# Patient Record
Sex: FEMALE | Race: AMERICAN INDIAN OR ALASKA NATIVE
[De-identification: names, ages, dates, MRNs, and addresses within clinical notes are randomized per-mention and may not be internally consistent; named-entity substitution may affect disease eponyms.]

---

## 2017-04-01 NOTE — HISTORY AND PHYSICAL REPORT
History of Present Illness


Chief complaint: 





I cant breathe


History of present illness: 


63 YO Female with HTN, MI, CAD, PUD, HLD, Vertigo, presents to ED for 

evaluation. Pt states that she has been experiencing shortness of breath for 

the past several days, with worsening symptoms over the past 1 day. Pt 

acknowledges orthopnea, PND, and l bilateral leg swelling. Pt denies productive 

cough, fever, chills, CP, Palpitations, NVD, syncope, vertigo, trauma, 

prolonged immobility/travel, individual/family history of DVT/PE. 











Past History


Past Medical History: acute MI, CAD, hyperlipidemia


Past Surgical History: CABG, , Other (Right leg surgery)


Social history: single.  denies: smoking, alcohol abuse, prescription drug abuse


Family history: CAD, diabetes, hypertension





Medications and Allergies


 Allergies











Allergy/AdvReac Type Severity Reaction Status Date / Time


 


No Known Allergies Allergy   Verified 09/03/15 03:02











 Home Medications











 Medication  Instructions  Recorded  Confirmed  Last Taken  Type


 


Metoprolol [Lopressor TAB] 50 mg PO BID #60 tablet 09/04/15 04/01/17 06/24/16 Rx


 


amLODIPine [Norvasc] 10 mg PO QDAY #30 tablet 09/04/15 04/01/17 Unknown Rx


 


Cilostazol [Pletal] 50 mg PO BID 16 Unknown History


 


Clopidogrel [Plavix] 75 mg PO QDAY 16 Unknown History


 


Lisinopril [Zestril TAB] 40 mg PO QDAY 16 History


 


ALBUTEROL Inhaler [Proair] 2 puff IH QID PRN 17 Unknown History


 


AtorvaSTATin [Lipitor] 80 mg PO QHS 17 Unknown History


 


Hydrochlorothiazide [HCTZ] 25 mg PO QAM 17 Unknown History














Review of Systems


All systems: negative


Cardiovascular: orthopnea, shortness of breath, dyspnea on exertion, paroxysmal 

nocturnal dyspnea, leg edema


Respiratory: shortness of breath





Exam





- Constitutional


Vitals: 


 











Temp Pulse Resp BP Pulse Ox


 


 98.1 F   105 H  16   178/119   98 


 


 17 09:23  17 15:15  17 15:15  17 15:15  17 15:15











General appearance: Present: no acute distress, well-nourished





- EENT


Eyes: Present: PERRL


ENT: hearing intact, clear oral mucosa





- Neck


Neck: Present: supple, normal ROM





- Respiratory


Respiratory effort: normal


Respiratory: bilateral: diminished, rhonchi





- Cardiovascular


Heart Sounds: Present: S1 & S2.  Absent: rub, click





- Extremities


Extremities: pulses symmetrical, No edema


Extremity abnormal: edema


Peripheral Pulses: within normal limits





- Abdominal


General gastrointestinal: Present: soft, non-tender, non-distended, normal 

bowel sounds


Female genitourinary: Present: normal





- Integumentary


Integumentary: Present: clear, warm, dry





- Musculoskeletal


Musculoskeletal: gait normal, strength equal bilaterally





- Psychiatric


Psychiatric: appropriate mood/affect, intact judgment & insight





- Neurologic


Neurologic: CNII-XII intact, moves all extremities





Results





- Labs


CBC & Chem 7: 


 17 07:03





 17 07:03


Labs: 


 Abnormal lab results











  17 Range/Units





  07:03 07:03 10:39 


 


RBC   5.47 H   (3.65-5.03)  M/mm3


 


Hgb   15.3 H   (10.1-14.3)  gm/dl


 


Hct   47.1 H   (30.3-42.9)  %


 


D-Dimer    486.58 H  (0-234)  ng/mlDDU


 


Glucose  119 H    ()  mg/dL


 


NT-Pro-B Natriuret Pep  7335 H    (0-900)  pg/mL














Assessment and Plan





- Patient Problems


(1) Acute respiratory failure with hypoxemia


Current Visit: Yes   Status: Acute   


Plan to address problem: 


Supplemental oxygen, nebs, aspiration precautions, NIPPV as clinically indicated

, pulmonary toilet, incentive spirometry








(2) CHF (congestive heart failure)


Current Visit: Yes   Status: Suspected   


Qualifiers: 


   Congestive heart failure type: C   Congestive heart failure chronicity: C 


Plan to address problem: 


CHF Protocol: Cardiology consulted, echo, serial cardiac enzymes, ekg, 

telemetry monitoring, fluid restriction, daily weight, afterload reduction.








(3) Accelerated hypertension


Current Visit: Yes   Status: Acute   


Plan to address problem: 


monitor bp q shift, continue current antihypertensive therapy








(4) HLD (hyperlipidemia)


Current Visit: Yes   Status: Acute   


Qualifiers: 


   Hyperlipidemia type: H 


Plan to address problem: 


continue statin therapy








(5) DVT prophylaxis


Current Visit: Yes   Status: Acute

## 2017-04-01 NOTE — EMERGENCY DEPARTMENT REPORT
ED Shortness of Breath HPI





- General


Chief Complaint: Dyspnea/Respdistress


Stated Complaint: DIFFICULTY IN BREATHING


Time Seen by Provider: 04/01/17 10:10


Source: patient


Mode of arrival: Ambulatory


Limitations: No Limitations





- History of Present Illness


Initial Comments: 


The patient describes episodes of essentially paroxysmal nocturnal dyspnea.  

She states that this occurs when a 1 time a night.  She does not describe 

difficulty and cough or hemoptysis.  She denies chest pain pressure or 

tightness.  She's had no hemoptysis.  She denies leg pain or swelling.  She 

does state that these episodes to improve with sitting up.





The patient is status post CABG she states triple bypass at [facility] 5 

years ago.  She states she has not followed up with cardiologist for quite some 

time maybe even since then.  She is probably had an angioplasty of her right 

leg as well.  She does not complain of any claudication symptoms.  She does not 

give a history of CHF per se.  She has a history of hypertension.





Apparently the patient was hyperventilating on arrival.  This did improve with 

nursing de-escalation.  At the time I saw her she was breathing normal 

respiratory rate.





MD Complaint: shortness of breath


-: week(s)


Consistency: intermittent


Known History Of: other


Associated Symptoms: denies other symptoms


Treatments Prior to Arrival: none





- Related Data


Home Oxygen Therapy: No


 Home Medications











 Medication  Instructions  Recorded  Confirmed  Last Taken


 


Cilostazol [Pletal] 50 mg PO BID 06/25/16 06/25/16 Unknown


 


Clopidogrel [Plavix] 75 mg PO QDAY 06/25/16 06/25/16 Unknown


 


Lisinopril [Zestril TAB] 40 mg PO QDAY 06/25/16 06/25/16 06/24/16








 Previous Rx's











 Medication  Instructions  Recorded  Last Taken  Type


 


Metoprolol [Lopressor TAB] 50 mg PO BID #60 tablet 09/04/15 06/24/16 Rx


 


amLODIPine [Norvasc] 10 mg PO QDAY #30 tablet 09/04/15 Unknown Rx











 Allergies











Allergy/AdvReac Type Severity Reaction Status Date / Time


 


No Known Allergies Allergy   Verified 09/03/15 03:02














ED Review of Systems


ROS: 


Stated complaint: DIFFICULTY IN BREATHING


Other details as noted in HPI





Constitutional: denies: chills, fever


Eyes: denies: eye pain, eye discharge, vision change


ENT: denies: ear pain, throat pain


Respiratory: shortness of breath.  denies: cough, wheezing


Cardiovascular: denies: chest pain, palpitations


Endocrine: no symptoms reported


Gastrointestinal: denies: abdominal pain, nausea, diarrhea


Genitourinary: denies: urgency, dysuria, discharge


Musculoskeletal: denies: back pain, joint swelling, arthralgia


Skin: denies: rash, lesions


Neurological: denies: headache, weakness, paresthesias


Psychiatric: denies: anxiety, depression


Hematological/Lymphatic: denies: easy bleeding, easy bruising





ED Past Medical Hx





- Past Medical History


Previous Medical History?: Yes


Hx Hypertension: Yes


Hx Heart Attack/AMI: Yes





- Surgical History


Past Surgical History?: Yes


Additional Surgical History: triple bypass, rt leg surgery,CS





- Social History


Smoking Status: Never Smoker


Substance Use Type: None





- Medications


Home Medications: 


 Home Medications











 Medication  Instructions  Recorded  Confirmed  Last Taken  Type


 


Metoprolol [Lopressor TAB] 50 mg PO BID #60 tablet 09/04/15 06/25/16 06/24/16 Rx


 


amLODIPine [Norvasc] 10 mg PO QDAY #30 tablet 09/04/15 06/25/16 Unknown Rx


 


Cilostazol [Pletal] 50 mg PO BID 06/25/16 06/25/16 Unknown History


 


Clopidogrel [Plavix] 75 mg PO QDAY 06/25/16 06/25/16 Unknown History


 


Lisinopril [Zestril TAB] 40 mg PO QDAY 06/25/16 06/25/16 06/24/16 History














ED Physical Exam





- General


Limitations: No Limitations


General appearance: alert, in no apparent distress





- Head


Head exam: Present: atraumatic, normocephalic





- Eye


Eye exam: Present: normal appearance.  Absent: scleral icterus





- ENT


ENT exam: Present: normal exam, mucous membranes moist





- Neck


Neck exam: Present: normal inspection.  Absent: tenderness, meningismus





- Respiratory


Respiratory exam: Present: normal lung sounds bilaterally.  Absent: respiratory 

distress





- Cardiovascular


Cardiovascular Exam: Present: regular rate, normal rhythm.  Absent: systolic 

murmur, diastolic murmur, rubs, gallop





- GI/Abdominal


GI/Abdominal exam: Present: soft, normal bowel sounds.  Absent: distended, 

tenderness, guarding, rebound, rigid





- Extremities Exam


Extremities exam: Present: normal inspection





- Back Exam


Back exam: Present: normal inspection





- Neurological Exam


Neurological exam: Present: alert, oriented X3, CN II-XII intact.  Absent: 

motor sensory deficit





- Psychiatric


Psychiatric exam: Present: normal affect, normal mood





- Skin


Skin exam: Present: warm, dry, intact, normal color.  Absent: rash





ED Course


 Vital Signs











  04/01/17 04/01/17 04/01/17





  06:52 09:11 09:20


 


Temperature 98.6 F  


 


Pulse Rate 98 H  88


 


Respiratory 24  28 H





Rate   


 


Blood Pressure   168/115


 


Blood Pressure 179/106  





[Right]   


 


O2 Sat by Pulse 99 99 100





Oximetry   














  04/01/17 04/01/17 04/01/17





  09:23 09:30 09:40


 


Temperature 98.1 F  


 


Pulse Rate 85 80 82


 


Respiratory 16 21 15





Rate   


 


Blood Pressure  166/89 166/89


 


Blood Pressure 168/115  





[Right]   


 


O2 Sat by Pulse 98 99 100





Oximetry   














  04/01/17 04/01/17 04/01/17





  09:50 10:00 10:10


 


Temperature   


 


Pulse Rate 85  


 


Respiratory 19 24 12





Rate   


 


Blood Pressure 162/88 159/88 162/88


 


Blood Pressure   





[Right]   


 


O2 Sat by Pulse 100 99 99





Oximetry   














  04/01/17 04/01/17 04/01/17





  10:20 11:38 11:40


 


Temperature   


 


Pulse Rate   


 


Respiratory 12 15 19





Rate   


 


Blood Pressure 162/90 162/90 162/90


 


Blood Pressure   





[Right]   


 


O2 Sat by Pulse 99 99 99





Oximetry   














  04/01/17 04/01/17 04/01/17





  11:50 12:00 12:10


 


Temperature   


 


Pulse Rate   


 


Respiratory 15 10 L 15





Rate   


 


Blood Pressure 164/104 156/100 162/90


 


Blood Pressure   





[Right]   


 


O2 Sat by Pulse 97 97 99





Oximetry   














  04/01/17 04/01/17 04/01/17





  12:20 12:30 12:40


 


Temperature   


 


Pulse Rate   


 


Respiratory 15 14 16





Rate   


 


Blood Pressure 168/109 162/108 162/108


 


Blood Pressure   





[Right]   


 


O2 Sat by Pulse 99 99 99





Oximetry   














  04/01/17 04/01/17





  15:10 15:15


 


Temperature  


 


Pulse Rate  105 H


 


Respiratory  16





Rate  


 


Blood Pressure 162/108 


 


Blood Pressure  178/119





[Right]  


 


O2 Sat by Pulse 98 98





Oximetry  














- Reevaluation(s)


Reevaluation #1: 


CTA of the chest was performed to exclude pulmonary embolism.  Per the 

radiologist that showed an incidental guide wire piece in the patient's left 

pulmonary artery.  It was not very compelling for pulmonary edema.  In fact the 

lung fields were essentially clear.  Cardiomegaly was noted.


04/01/17 15:39





Reevaluation #2: 


Patient given aspirin and hydralazine and Nitropaste.  She is referred to Dr. Montgomery for further evaluation on the hospitalist service.  I believe the 

radiologist's observation of the pulmonary intralobar artery catheter wire 

months surely be chronic as the patient has had no catheterization for 5 years


04/01/17 15:43








ED Medical Decision Making





- Lab Data


Result diagrams: 


 04/01/17 07:03





 04/01/17 07:03








 Laboratory Results - last 24 hr











  04/01/17 04/01/17





  07:03 07:03


 


WBC   8.8


 


RBC   5.47 H


 


Hgb   15.3 H


 


Hct   47.1 H


 


MCV   86


 


MCH   28


 


MCHC   32


 


RDW   14.2


 


Plt Count   222


 


Lymph % (Auto)   24.8


 


Mono % (Auto)   5.9


 


Eos % (Auto)   1.4


 


Baso % (Auto)   0.7


 


Lymph #   2.2


 


Mono #   0.5


 


Eos #   0.1


 


Baso #   0.1


 


Seg Neutrophils %   67.2


 


Seg Neutrophils #   5.9


 


Sodium  139 


 


Potassium  4.1 


 


Chloride  100.5 


 


Carbon Dioxide  22 


 


Anion Gap  21 


 


BUN  17 


 


Creatinine  1.2 


 


Estimated GFR  55 


 


BUN/Creatinine Ratio  14.16 


 


Glucose  119 H 


 


Calcium  9.7 


 


Total Creatine Kinase  53 


 


CK-MB (CK-2)  1.5 


 


CK-MB (CK-2) Rel Index  2.8 


 


Troponin T  < 0.010 


 


NT-Pro-B Natriuret Pep  7335 H 














 Laboratory Results - last 24 hr











  04/01/17 04/01/17 04/01/17





  07:03 07:03 10:34


 


WBC   8.8 


 


RBC   5.47 H 


 


Hgb   15.3 H 


 


Hct   47.1 H 


 


MCV   86 


 


MCH   28 


 


MCHC   32 


 


RDW   14.2 


 


Plt Count   222 


 


Lymph % (Auto)   24.8 


 


Mono % (Auto)   5.9 


 


Eos % (Auto)   1.4 


 


Baso % (Auto)   0.7 


 


Lymph #   2.2 


 


Mono #   0.5 


 


Eos #   0.1 


 


Baso #   0.1 


 


Seg Neutrophils %   67.2 


 


Seg Neutrophils #   5.9 


 


Sodium  139  


 


Potassium  4.1  


 


Chloride  100.5  


 


Carbon Dioxide  22  


 


Anion Gap  21  


 


BUN  17  


 


Creatinine  1.2  


 


Estimated GFR  55  


 


BUN/Creatinine Ratio  14.16  


 


Glucose  119 H  


 


Calcium  9.7  


 


Total Creatine Kinase  53  


 


CK-MB (CK-2)  1.5  


 


CK-MB (CK-2) Rel Index  2.8  


 


Troponin T  < 0.010  


 


NT-Pro-B Natriuret Pep  7335 H  


 


Urine Color    Straw


 


Urine Turbidity    Clear


 


Urine pH    5.0


 


Ur Specific Gravity    1.004


 


Urine Protein    <15 mg/dl


 


Urine Glucose (UA)    Neg


 


Urine Ketones    Neg


 


Urine Blood    Neg


 


Urine Nitrite    Neg


 


Urine Bilirubin    Neg


 


Urine Urobilinogen    < 2.0


 


Ur Leukocyte Esterase    Neg


 


Urine WBC (Auto)    < 1.0


 


Urine RBC (Auto)    1.0


 


U Epithel Cells (Auto)    < 1.0

















- EKG Data


-: EKG Interpreted by Me


EKG shows normal: sinus rhythm, axis, intervals, QRS complexes, ST-T waves


Rate: normal





- EKG Data


Interpretation: LVH, other (occasional PVC)





- Radiology Data


Radiology results: report reviewed


interpreted by me: 


Associated cardiomegaly and perhaps slight cephalization of flow.





CTA of the chest was performed to exclude pulmonary embolism.  Per the 

radiologist that showed an incidental guide wire piece in the patient's left 

pulmonary artery.  It was not very compelling for pulmonary edema.  In fact the 

lung fields were essentially clear.  Cardiomegaly was noted.





Critical care attestation.: 


If time is entered above; I have spent that time in minutes in the direct care 

of this critically ill patient, excluding procedure time.








ED Disposition


Clinical Impression: 


 Paroxysmal nocturnal dyspnea, Diastolic dysfunction, Foreign body, 

Uncontrolled hypertension





Disposition: OP ADMITTED AS IP TO THIS HOSP


Is pt being admited?: Yes


Does the pt Need Aspirin: Yes


Condition: Stable


Instructions:  Hypertension (ED)


Referrals: 


PRIMARY CARE,MD [Primary Care Provider] - 3-5 Days


Time of Disposition: 15:43

## 2017-04-01 NOTE — CAT SCAN REPORT
FINAL REPORT



EXAM:  CT ANGIO CHEST



HISTORY:  TAE 



TECHNIQUE:  CTA of the chest with IV contrast. Multiplanar

reformations.  MIP images. 



PRIORS:  None.



FINDINGS:  

No thoracic aortic aneurysm or dissection seen. Moderate cardiac

enlargement. Visualized portions of the upper abdomen show no

significant abnormality. Status post sternotomy. No

pathologically enlarged lymph nodes seen. No pulmonary embolism

seen. There is a linear metallic density in the left interlobar

artery, compatible with a foreign body. This could be from a

prior catheterization. No thrombus is seen, however. 



No pleural effusion or significant lung consolidation seen. 



IMPRESSION:  

1. No pulmonary embolism seen. Metallic wire in the left

intralobar artery may be from a prior catheterization procedure. 



Christian Category 1 finding. I discussed these results immediately

before signing this report with Dr. Fine.

## 2017-04-01 NOTE — XRAY REPORT
Chest 2 views: Compared to 9/3/15.



History: Shortness of breath.



Findings:



Cardiomegaly. Trachea is midline. No consolidation, pneumothorax or 

pleural effusion.



Impression:



Cardiomegaly. No acute lung changes.

## 2017-04-02 NOTE — PROGRESS NOTE
Assessment and Plan


Assessment and plan: 





1.  Acute hypoxemic respiratory failure.  Continue supplemental oxygen, 

pulmonary toileting, incentive spirometry and NIPPV as needed.  Etiology 

secondary to CHF exacerbation.





2.  Acute CHF decompensation.  Etiology of systolic or diastolic unknown.  

Check echocardiogram.  Cardiology consultation pending.  Continue CHF protocol.





3.  Accelerated hypertension.  Continue antihypertensives medications.





4.  Hyperlipidemia.  Continue statin therapy.





5.  DVT prophylaxis.  Start Lovenox daily.





History


Interval history: 





History of present illness: 


61 YO Female with HTN, MI, CAD, PUD, HLD, previous CVA and vertigo admitted for 

acute hypoxemic respiratory failure secondary to new CHF exacerbation.





Hospitalist Physical





- Constitutional


Vitals: 


 











Temp Pulse Resp BP Pulse Ox


 


 98.2 F   76   20   108/68   97 


 


 04/02/17 05:00  04/02/17 09:40  04/02/17 05:00  04/02/17 09:40  04/02/17 05:00











General appearance: Present: no acute distress, well-nourished





- EENT


Eyes: Present: PERRL, EOM intact


ENT: hearing intact, clear oral mucosa, dentition normal





- Neck


Neck: Present: supple, normal ROM





- Respiratory


Respiratory effort: normal


Respiratory: bilateral: diminished, rales





- Cardiovascular


Rhythm: regular


Heart Sounds: Present: S1 & S2.  Absent: gallop, rub





- Extremities


Extremities: no ischemia, No edema, Full ROM





- Abdominal


General gastrointestinal: soft, non-tender, non-distended, normal bowel sounds





- Integumentary


Integumentary: Present: clear, warm, dry





- Neurologic


Neurologic: CNII-XII intact, moves all extremities





Results





- Labs


CBC & Chem 7: 


 04/01/17 07:03





 04/01/17 07:03


Labs: 


 Laboratory Last Values











WBC  8.8 K/mm3 (4.5-11.0)   04/01/17  07:03    


 


RBC  5.47 M/mm3 (3.65-5.03)  H  04/01/17  07:03    


 


Hgb  15.3 gm/dl (10.1-14.3)  H  04/01/17  07:03    


 


Hct  47.1 % (30.3-42.9)  H  04/01/17  07:03    


 


MCV  86 fl (79-97)   04/01/17  07:03    


 


MCH  28 pg (28-32)   04/01/17  07:03    


 


MCHC  32 % (30-34)   04/01/17  07:03    


 


RDW  14.2 % (13.2-15.2)   04/01/17  07:03    


 


Plt Count  222 K/mm3 (140-440)   04/01/17  07:03    


 


Lymph % (Auto)  24.8 % (13.4-35.0)   04/01/17  07:03    


 


Mono % (Auto)  5.9 % (0.0-7.3)   04/01/17  07:03    


 


Eos % (Auto)  1.4 % (0.0-4.3)   04/01/17  07:03    


 


Baso % (Auto)  0.7 % (0.0-1.8)   04/01/17  07:03    


 


Lymph #  2.2 K/mm3 (1.2-5.4)   04/01/17  07:03    


 


Mono #  0.5 K/mm3 (0.0-0.8)   04/01/17  07:03    


 


Eos #  0.1 K/mm3 (0.0-0.4)   04/01/17  07:03    


 


Baso #  0.1 K/mm3 (0.0-0.1)   04/01/17  07:03    


 


Seg Neutrophils %  67.2 % (40.0-70.0)   04/01/17  07:03    


 


Seg Neutrophils #  5.9 K/mm3 (1.8-7.7)   04/01/17  07:03    


 


PT  13.3 Sec. (12.2-14.9)   04/01/17  10:39    


 


INR  1.02  (0.87-1.13)   04/01/17  10:39    


 


APTT  27.9 Sec. (24.2-36.6)   04/01/17  10:39    


 


D-Dimer  486.58 ng/mlDDU (0-234)  H  04/01/17  10:39    


 


Sodium  139 mmol/L (137-145)   04/01/17  07:03    


 


Potassium  4.1 mmol/L (3.6-5.0)   04/01/17  07:03    


 


Chloride  100.5 mmol/L ()   04/01/17  07:03    


 


Carbon Dioxide  22 mmol/L (22-30)   04/01/17  07:03    


 


Anion Gap  21 mmol/L  04/01/17  07:03    


 


BUN  17 mg/dL (7-17)   04/01/17  07:03    


 


Creatinine  1.2 mg/dL (0.7-1.2)   04/01/17  07:03    


 


Estimated GFR  55 ml/min  04/01/17  07:03    


 


BUN/Creatinine Ratio  14.16 %  04/01/17  07:03    


 


Glucose  119 mg/dL ()  H  04/01/17  07:03    


 


Calcium  9.7 mg/dL (8.4-10.2)   04/01/17  07:03    


 


Total Bilirubin  0.8 mg/dL (0.1-1.2)   04/01/17  10:39    


 


Direct Bilirubin  < 0.2 mg/dL (0-0.2)   04/01/17  10:39    


 


AST  25 units/L (5-40)   04/01/17  10:39    


 


ALT  27 units/L (7-56)   04/01/17  10:39    


 


Alkaline Phosphatase  97 units/L ()   04/01/17  10:39    


 


Total Creatine Kinase  70 units/L ()   04/01/17  20:26    


 


CK-MB (CK-2)  1.7 ng/mL (0.0-4.0)   04/01/17  20:26    


 


CK-MB (CK-2) Rel Index  2.4  (0-4)   04/01/17  20:26    


 


Troponin T  < 0.010 ng/mL (0.00-0.029)   04/01/17  20:26    


 


NT-Pro-B Natriuret Pep  7335 pg/mL (0-900)  H  04/01/17  07:03    


 


Total Protein  7.0 g/dL (6.3-8.2)   04/01/17  10:39    


 


Albumin  4.3 g/dL (3.9-5)   04/01/17  10:39    


 


Albumin/Globulin Ratio  1.6 %  04/01/17  10:39    


 


Urine Color  Straw  (Yellow)   04/01/17  10:34    


 


Urine Turbidity  Clear  (Clear)   04/01/17  10:34    


 


Urine pH  5.0  (5.0-7.0)   04/01/17  10:34    


 


Ur Specific Gravity  1.004  (1.003-1.030)   04/01/17  10:34    


 


Urine Protein  <15 mg/dl mg/dL (Negative)   04/01/17  10:34    


 


Urine Glucose (UA)  Neg mg/dL (Negative)   04/01/17  10:34    


 


Urine Ketones  Neg mg/dL (Negative)   04/01/17  10:34    


 


Urine Blood  Neg  (Negative)   04/01/17  10:34    


 


Urine Nitrite  Neg  (Negative)   04/01/17  10:34    


 


Urine Bilirubin  Neg  (Negative)   04/01/17  10:34    


 


Urine Urobilinogen  < 2.0 mg/dL (<2.0)   04/01/17  10:34    


 


Ur Leukocyte Esterase  Neg  (Negative)   04/01/17  10:34    


 


Urine WBC (Auto)  < 1.0 /HPF (0.0-6.0)   04/01/17  10:34    


 


Urine RBC (Auto)  1.0 /HPF (0.0-6.0)   04/01/17  10:34    


 


U Epithel Cells (Auto)  < 1.0 /HPF (0-13.0)   04/01/17  10:34

## 2017-04-02 NOTE — CONSULTATION
History of Present Illness


Consult date: 17


Consult reason: shortness of breath


History of present illness: 





Impression 





Acute dyspnea, orthopnea, edema approx one day, quick turnaround


she states she feels great now


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


HTN


PVD





Plan


NT-proBNP elevated, HTN was elevated initially now normalized


CXR and exam unremarkable.


would treat with IV diuretics, adjust BP meds, check echo


trop negative x 3 





Past History


Past Medical History: acute MI, CAD, hyperlipidemia


Past Surgical History: CABG, , Other (Right leg surgery)


Social history: single.  denies: smoking, alcohol abuse, prescription drug abuse


Family history: CAD, diabetes, hypertension





Medications and Allergies


 Allergies











Allergy/AdvReac Type Severity Reaction Status Date / Time


 


No Known Allergies Allergy   Verified 09/03/15 03:02











 Home Medications











 Medication  Instructions  Recorded  Confirmed  Last Taken  Type


 


Metoprolol [Lopressor TAB] 50 mg PO BID #60 tablet 09/04/15 04/01/17 06/24/16 Rx


 


amLODIPine [Norvasc] 10 mg PO QDAY #30 tablet 09/04/15 04/01/17 Unknown Rx


 


Cilostazol [Pletal] 50 mg PO BID 16 Unknown History


 


Clopidogrel [Plavix] 75 mg PO QDAY 16 Unknown History


 


Lisinopril [Zestril TAB] 40 mg PO QDAY 16 History


 


ALBUTEROL Inhaler [Proair] 2 puff IH QID PRN 17 Unknown History


 


AtorvaSTATin [Lipitor] 80 mg PO QHS 17 Unknown History


 


Hydrochlorothiazide [HCTZ] 25 mg PO QAM 17 Unknown History











Active Meds: 


Active Medications





Acetaminophen (Tylenol)  650 mg PO Q4H PRN


   PRN Reason: Pain MILD(1-3)/Fever >100.5/HA


   Last Admin: 17 21:42 Dose:  650 mg


Albuterol (Proventil)  2.5 mg IH Q4HRT PRN


   PRN Reason: Shortness Of Breath


Albuterol/Ipratropium (Duoneb 0.5 Mg-3 Mg/3 Ml Soln)  1 ampul IH Q6HRT [facility]


   Last Admin: 17 07:56 Dose:  1 ampul


Amlodipine Besylate (Norvasc)  10 mg PO QDAY [facility]


   Last Admin: 17 09:39 Dose:  10 mg


Atorvastatin Calcium (Lipitor)  80 mg PO QHS [facility]


   Last Admin: 17 21:42 Dose:  80 mg


Cilostazol (Pletal)  50 mg PO BID [facility]


   Last Admin: 17 09:38 Dose:  50 mg


Clopidogrel Bisulfate (Plavix)  75 mg PO QDAY [facility]


   Last Admin: 17 09:38 Dose:  75 mg


Enoxaparin Sodium (Lovenox)  40 mg SUB-Q QDAY@1000 [facility]


Furosemide (Lasix)  20 mg IV QDAY [facility]


   Last Admin: 17 09:37 Dose:  20 mg


Hydrochlorothiazide (Hctz)  25 mg PO QAM [facility]


   Last Admin: 17 09:40 Dose:  25 mg


Lisinopril (Zestril)  40 mg PO QDAY [facility]


   Last Admin: 17 09:40 Dose:  40 mg


Metoprolol Tartrate (Lopressor)  50 mg PO BID [facility]


   Last Admin: 17 09:39 Dose:  50 mg


Ondansetron HCl (Zofran)  4 mg IV Q8H PRN


   PRN Reason: N/V unrelieved by Reglan


Sodium Chloride (Sodium Chloride Flush Syringe 10 Ml)  10 ml IV PRN PRN


   PRN Reason: LINE FLUSH











Physical Examination


 Vital Signs











Temp Pulse Resp BP Pulse Ox


 


 98.6 F   98 H  24   179/106   99 


 


 17 06:52  17 06:52  17 06:52  17 06:52  17 06:52











General appearance: no acute distress


HEENT: Positive: PERRL


Neck: Positive: neck supple


Cardiac: Positive: Reg Rate and Rhythm, S1/S2.  Negative: S3


Lungs: Positive: clear to auscultation


Abdomen: Positive: Soft





Results





 17 07:03





 17 07:03


 Cardiac Enzymes











  17 Range/Units





  18:01 20:26 


 


CK-MB (CK-2)  1.4  1.7  (0.0-4.0)  ng/mL

## 2017-04-03 NOTE — ADMIT CRITERIA FORM
Admission Criteria Documentation: 





                            CARDIOLOGY GRG





Clinical Indications for Admission to Inpatient Care


    


                                                                               (

Place 'X' for any and all applicable criteria): 





Hospital admission is needed for appropriate care of the patient because of ANY 

ONE of the following (1): 





[ ] I.    Hemodynamic instability as indicated by ALL of the following (1)(2)(3)

(4)(5)


         [ ]a)   Vital signs or other findings not as expected for chronic 

patient condition or baseline


         [ ]b)   Instability indicated by ANY ONE of the following:


                  [ ]i)     Hypotension


                  [ ]ii)    Symptomatic Tachycardia unresponsive to treatment (

e.g., analgesia, fluids, sedation as indicated)


                  [ ]iii)   Inadequate perfusion indicated by ANY ONE of the 

following:


                            [ ] 1)   Lactic acidosis (> 2 mmol/L)


                            [ ] 2)   New abnormal capillary refill (> 3 seconds)


                            [ ] 3)   Reduced urine output


                            [ ] 4)   New altered mental status


                  [ ]iv)   Orthostatic vital sign changes unresponsive to 

treatment (e.g., fluids)              


                  [ ]v)    IV inotropic or vasopressor medication required to 

maintain adequate blood pressure or perfusion


[ ] II.  Severe heart failure as indicated by ANY ONE of the following(17)(18)


         [ ]a)  Respiratory distress        


         [ ]b)  Hypotension


         [ ]c)  Anasarca (refractory to outpatient therapy) 


         [ ]d)  Cardiac arrhythmias of immediate concern 


         [ ]e)  Myocardial ischemia


[ ] III. Cardiac arrhythmias or findings of immediate concern indicated by ANY 

ONE of the following (19)(20):


         [ ] a)  Heart rhythms that are inherently dangerous or unstable 

indicated by ANY ONE of the following (21)(22)(23):


                 [ ] i)    Resuscitated ventricular fibrillation or cardiac 

arrest


                 [ ] ii)   Ventricular escape rhythm


                 [ ] iii)  Sustained ventricular tachycardia (30 seconds or 

more of ventricular rhythm at greater than 100 beats per minute)


                 [ ] iv)  Nonsustained ventricular tachycardia and ANY ONE of 

the following:


                          [ ] 1)    Suspected cardiac ischemia as cause or 

consequence of ventricular tachycardia    


                          [ ] 2)    In setting of acute myocarditis         


         [ ] b)  Unstable cardiac conduction defects indicated by ANY ONE of 

the following(23)(24)(25)


                  [ ] i)    Type II second-degree atrioventricular block    


                  [ ]ii)    Third-degree atrioventricular block                


                  [ ]iii)    New-onset left bundle branch block with suspected 

myocardial ischemia


         [ ]c)   Any heart rhythm and ANY ONE of the following (21)(22)(26)(27) 

(28)


                  [ ] i)    Continuous long-term ECG monitoring needed (e.g., 

initiation of drug requiring monitoring for more than 24 hours)


                  [ ] ii)   Patient has automatic implanted cardioverter 

defibrillator that is repeatedly firing, malfunctioning, or in need of 

immediate 


                            adjustment of settings beyond the scope of 

ambulatory or observation care


        [ ]d)    Heart rhythms of concern due to ANY ONE of the following:


                  [ ] i)    Hypotension


                  [ ] ii)    Respiratory distress


                  [ ] iii)   Association with other significant symptoms (e.g., 

bradycardia with syncope or ongoing dizziness, supraventricular 


                            tachycardia with chest pain (14)(15)(17)


[ ] IV.   Monitoring for cardiac contusion beyond the scope of observation care 

needed [A](30)(31)(32)


[ ] V.    Surgical or device complication (e.g., valve replacement complication

, pacemaker dysfunction) (35)(41)(44)(45)(46)


[ ] VI.   Inpatient palliative care needed. [B](49) Also use Inpatient 

Palliative Care Criteria


[ ] VII.  Nonbacterial thrombotic (marantic) endocarditis (36)(43)(47)(48)


[X ] VIII. Cardiology condition, symptom, or finding for which emergency and 

observation care has failed or are not considered appropriate.


[ ] IX.   Acute valvular disease requiring inpatient as indicated by ANY ONE of 

the following (41)


          [ ]a)   Acute valvular regurgitation (42)


          [ ]b)   Noninfectious valvulitis (43)


          [ ]c)   Obstructive valve thrombosis 


          [ ]d)   Paravalvular leak


          [ ]e)   Other significant valvular disorder remaining after emergency 

or observation level of care (as appropriate)


[ ]X.    Pericardial disease requiring inpatient treatment as indicated by ANY 

ONE of the following (33)(34)(35)(36)(37)           


          [ ]a)   Suspected tamponade (38)(39)(40)


          [ ]b)   Hemopericardium


          [ ]c)   Other significant pericardial disorder remaining after 

emergency or observation level of care (as appropriate)


[ ] XI.  Cardiac ischemia beyond scope of emergency and observation care. 


[ ] XII. Hypertension requiring inpatient treatment as indicated by ANY ONE of 

the following (6)(7)(8)


         [ ]a)    SBP greater than 220 mm Hg or DBP greater than 120 mmHg 

despite treatment


         [ ]b)    SBP greater than 140 mm Hg or DBP greater than 100 mm Hg with 

evidence of acute end organ damage as indicated


                   by ANY ONE of the following


                   [ ] i)      Altered mental status


                   [ ] ii)     Acute renal failure as indicated by new onset of 

ANY ONE of the following (9)(10)(11)(12)(13)


                               [ ]1)  3-fold rise in serum creatinine from 

baseline


                               [ ]2)  Serum creatinine greater than 4 mg/dL (

354 micromoles/L) with acute rise greater than 0.5 mg/dL (44.2 micromoles/L)


                               [ ]3)  Reduction of more than 75% in estimated 

glomerular filtration rate from baseline 


                               [ ]4)  Estimated glomerular filtration rate less 

than 35 mL/min/1.73m2 (0.59 mL/sec/1.73m2) in child up to 18 years of age


                               [ ]5)  Cessation of urine output indicated by 

ALL of the following


                                       [ ]A.   Adequate volume status


                                       [ ]B.   Inadequate urine output as 

indicated by ANY ONE of the following       


                                                [ ]a.   Urine output less than 

0.3 mL/kg/hr for 24 hours


                                                [ ]b.   Anuria (urine output 

less than 0.1 mL/kg/hr) for 12 hours 


                  [ ] iii)      Aortic dissection


                  [ ] iv)      Myocardial Ischemia


                  [ ] v)       Left ventricular heart failure 


                  [ ]vi)       Retinal Hemorrhage


                  [ ]vii)      Other significant finding


          [ ]c)   Hypertension in child requiring inpatient treatment as 

indicated by ALL of the following(14)(15)(16)


                  [ ] i)        Outpatient treatment not effective, not 

available, or not appropriate               


                  [ ]ii)        SBP or DBP greater than 95th percentile for age


                  [ ]iii)       Evidence of acute end organ damage as indicated 

by ANY ONE of the following 


                               [ ]1)     Altered mental status


                               [ ]2)    Acute renal failure as indicated by new 

onset of ANY ONE of the following(9)(10)(11)(12)(13)


                                         [ ]A.    3-fold rise in serum 

creatinine from baseline


                                         [ ]B.    Serum creatinine greater than 

4 mg/dL (354 micromoles/L) with acute rise greater than 0.5 mg/dL (44.2 

micromoles/L)


                                         [ ]C.    Reduction of more than 75% in 

estimated glomerular filtration rate from baseline


                                         [ ]D.    Estimated glomerular 

filtration rate less than 35 mL/min/1.73m2 (0.59 mL/sec/1.73m2) in child up to 

18 years of age 


                                         [ ]E.    Cessation of urine output 

indicated by ALL of the following 


                                                   [ ]a.  Adequate volume status


                                                   [ ]b.  Inadequate urine 

output as indicated by ANY ONE of the following 


                                                           [ ]i)    Urine 

output less than 0.3 mL/kg/hr for 24 hours


                                                           [ ]ii)   Anuria (

urine output less than 0.1 mL/kg/hr) for 12 hours                              

  


                               [ ]3)  Severe headache


                               [ ]4)  Visual disturbance 


                               [ ]5)  Retinal hemorrhage


                               [ ]6)  Other significant finding


[ ]XIII.   Complications of transplanted heart indicated by ANY ONE of the 

following(61):


           [ ]a)  Acute graft rejection requiring inpatient management (eg, 

intravenous immunosuppression)(62)(63)


           [ ]b)  Acute graft heart failure indicated by ANY ONE of the 

following(64):


                   [ ]i)   Hemodynamic instability


                   [ ]ii)  Cardiac arrhythmias of immediate concern


                   [ ]iii) Pulmonary edema that is very severe (eg, mechanical 

ventilation needed,


                          imminent or likely, need for 100% oxygen to keep 

oxygen saturation above 90%)


                   [ ]iv) Pulmonary edema that is persistent as indicated by ALL

 of the following:


                          [ ]1)   New need for oxygen therapy to keep oxygen 

saturation above 90% (or increased FiO2 need from baseline)


                          [ ]2)   Has not improved sufficiently with emergency 

department or observation


                                   care IV diuretics or other heart failure 

treatments[E]


                   [ ]v)   Altered mental status that is severe or persistent


                   [ ]vi)   Increased creatinine (new on laboratory test) with 

reduction of more than 50% in


                            estimated glomerular filtration rate from baseline


                   [ ]vii)  Progressively (ongoing) rising creatinine (known 

from past laboratory test) with


                            reduction of more than 25% in estimated glomerular 

filtration rate from baseline


                   [ ]viii) Acute renal failure


                   [ ]ix)  Acute peripheral ischemia (eg, examination shows 

pulseless, cool, mottled, or cyanotic extremity)


                   [ ]x)   Pulmonary artery catheter monitoring needed


                   [ ]xi)  Other sign or symptom of heart failure requiring 

inpatient treatment (ie, too severe or


                            not responsive to outpatient and observation care 

treatment)


        [ ]c)    Infection requiring inpatient management (eg, Hemodynamic 

instability, need for intravenous antimicrobial treatment)(66)(67)(68)(69)(70)


        [ ]d)   Cardiac allograft vasculopathy requiring inpatient management (

eg evidence of cardiac ischemia)(71)


        [ ]e)   Other complication of transplanted heart (eg, stroke, severe 

pulmonary hypertension, severe valvular 


                 dysfunction) requiring inpatient management(72)








The original [facility] [employer] content created by [employer] has been revised. 


The portions of the content which have been revised are identified through the 

use of italic text or in bold, and [facility] 


has neither reviewed nor approved the modified material. All other unmodified 

content is copyright  [facility] [employer].





Please see references footnoted in the original [facility] [employer] edition 

2016





Admission Criteria Met: Yes

## 2017-04-03 NOTE — PROGRESS NOTE
Assessment and Plan


Assessment and plan: 





1.  Acute hypoxemic respiratory failure.  Continue supplemental oxygen, 

pulmonary toileting, incentive spirometry and NIPPV as needed.  Etiology 

secondary to CHF exacerbation.





2.  Acute systolic CHF decompensation.  Follow-up echocardiogram.  Cardiology 

consultation pending.  Continue CHF protocol.





3.  Coronary artery disease.  Patient with a history of ischemic cardiomyopathy 

status post CABG over 10 years ago.  Given the age of the CABG being greater 

than 10 years, cardiology would like to evaluate with stress thallium to assess 

patency of grafts.





4.  Accelerated hypertension.  Continue antihypertensives medications.





5.  Hyperlipidemia.  Continue statin therapy.





6.  DVT prophylaxis.  Start Lovenox daily.





- Patient Problems


(1) Accelerated hypertension


Current Visit: Yes   Status: Acute   





(2) Acute respiratory failure with hypoxemia


Current Visit: Yes   Status: Acute   





(3) HLD (hyperlipidemia)


Current Visit: Yes   Status: Acute   


Qualifiers: 


   Hyperlipidemia type: H 





(4) Paroxysmal nocturnal dyspnea


Current Visit: Yes   Status: Acute   





(5) Uncontrolled hypertension


Current Visit: Yes   Status: Acute   





(6) CHF (congestive heart failure)


Current Visit: Yes   Status: Suspected   


Qualifiers: 


   Congestive heart failure type: C   Congestive heart failure chronicity: C 





(7) Hyperlipidemia


Current Visit: No   Status: Chronic   


Qualifiers: 


   Hyperlipidemia type: H 





History


Interval history: 





History of present illness: 


63 YO Female with HTN, MI, CAD, PUD, HLD, previous CVA and vertigo admitted for 

acute hypoxemic respiratory failure secondary to new CHF exacerbation.





Hospitalist Physical





- Constitutional


Vitals: 


 











Temp Pulse Resp BP Pulse Ox


 


 97.5 F L  70   20   120/76   98 


 


 04/03/17 09:00  04/03/17 10:00  04/03/17 10:00  04/03/17 09:00  04/03/17 10:00











General appearance: Present: no acute distress, well-nourished





- EENT


Eyes: Present: PERRL, EOM intact


ENT: hearing intact, clear oral mucosa, dentition normal





- Neck


Neck: Present: supple, normal ROM





- Respiratory


Respiratory effort: normal


Respiratory: bilateral: CTA





- Cardiovascular


Rhythm: regular


Heart Sounds: Present: S1 & S2.  Absent: gallop, rub





- Extremities


Extremities: no ischemia, No edema, Full ROM





- Abdominal


General gastrointestinal: soft, non-tender, non-distended, normal bowel sounds





- Integumentary


Integumentary: Present: clear, warm, dry





- Neurologic


Neurologic: CNII-XII intact, moves all extremities





Results





- Labs


CBC & Chem 7: 


 04/03/17 08:49





 04/03/17 08:49


Labs: 


 Laboratory Last Values











WBC  8.8 K/mm3 (4.5-11.0)   04/03/17  08:49    


 


RBC  5.37 M/mm3 (3.65-5.03)  H  04/03/17  08:49    


 


Hgb  15.0 gm/dl (10.1-14.3)  H  04/03/17  08:49    


 


Hct  45.8 % (30.3-42.9)  H  04/03/17  08:49    


 


MCV  85 fl (79-97)   04/03/17  08:49    


 


MCH  28 pg (28-32)   04/03/17  08:49    


 


MCHC  33 % (30-34)   04/03/17  08:49    


 


RDW  14.1 % (13.2-15.2)   04/03/17  08:49    


 


Plt Count  239 K/mm3 (140-440)   04/03/17  08:49    


 


Lymph % (Auto)  29.1 % (13.4-35.0)   04/03/17  08:49    


 


Mono % (Auto)  8.2 % (0.0-7.3)  H  04/03/17  08:49    


 


Eos % (Auto)  2.8 % (0.0-4.3)   04/03/17  08:49    


 


Baso % (Auto)  0.5 % (0.0-1.8)   04/03/17  08:49    


 


Lymph #  2.6 K/mm3 (1.2-5.4)   04/03/17  08:49    


 


Mono #  0.7 K/mm3 (0.0-0.8)   04/03/17  08:49    


 


Eos #  0.3 K/mm3 (0.0-0.4)   04/03/17  08:49    


 


Baso #  0.0 K/mm3 (0.0-0.1)   04/03/17  08:49    


 


Seg Neutrophils %  59.4 % (40.0-70.0)   04/03/17  08:49    


 


Seg Neutrophils #  5.2 K/mm3 (1.8-7.7)   04/03/17  08:49    


 


PT  13.3 Sec. (12.2-14.9)   04/01/17  10:39    


 


INR  1.02  (0.87-1.13)   04/01/17  10:39    


 


APTT  27.9 Sec. (24.2-36.6)   04/01/17  10:39    


 


D-Dimer  486.58 ng/mlDDU (0-234)  H  04/01/17  10:39    


 


Sodium  140 mmol/L (137-145)   04/03/17  08:49    


 


Potassium  3.5 mmol/L (3.6-5.0)  L  04/03/17  08:49    


 


Chloride  99.2 mmol/L ()   04/03/17  08:49    


 


Carbon Dioxide  26 mmol/L (22-30)   04/03/17  08:49    


 


Anion Gap  18 mmol/L  04/03/17  08:49    


 


BUN  24 mg/dL (7-17)  H  04/03/17  08:49    


 


Creatinine  1.5 mg/dL (0.7-1.2)  H  04/03/17  08:49    


 


Estimated GFR  42 ml/min  04/03/17  08:49    


 


BUN/Creatinine Ratio  16.00 %  04/03/17  08:49    


 


Glucose  122 mg/dL ()  H  04/03/17  08:49    


 


Calcium  9.4 mg/dL (8.4-10.2)   04/03/17  08:49    


 


Total Bilirubin  0.8 mg/dL (0.1-1.2)   04/01/17  10:39    


 


Direct Bilirubin  < 0.2 mg/dL (0-0.2)   04/01/17  10:39    


 


AST  25 units/L (5-40)   04/01/17  10:39    


 


ALT  27 units/L (7-56)   04/01/17  10:39    


 


Alkaline Phosphatase  97 units/L ()   04/01/17  10:39    


 


Total Creatine Kinase  67 units/L ()   04/02/17  15:00    


 


CK-MB (CK-2)  1.2 ng/mL (0.0-4.0)   04/02/17  15:00    


 


CK-MB (CK-2) Rel Index  1.7  (0-4)   04/02/17  15:00    


 


Troponin T  < 0.010 ng/mL (0.00-0.029)   04/02/17  15:00    


 


NT-Pro-B Natriuret Pep  7335 pg/mL (0-900)  H  04/01/17  07:03    


 


Total Protein  7.0 g/dL (6.3-8.2)   04/01/17  10:39    


 


Albumin  4.3 g/dL (3.9-5)   04/01/17  10:39    


 


Albumin/Globulin Ratio  1.6 %  04/01/17  10:39    


 


Urine Color  Straw  (Yellow)   04/01/17  10:34    


 


Urine Turbidity  Clear  (Clear)   04/01/17  10:34    


 


Urine pH  5.0  (5.0-7.0)   04/01/17  10:34    


 


Ur Specific Gravity  1.004  (1.003-1.030)   04/01/17  10:34    


 


Urine Protein  <15 mg/dl mg/dL (Negative)   04/01/17  10:34    


 


Urine Glucose (UA)  Neg mg/dL (Negative)   04/01/17  10:34    


 


Urine Ketones  Neg mg/dL (Negative)   04/01/17  10:34    


 


Urine Blood  Neg  (Negative)   04/01/17  10:34    


 


Urine Nitrite  Neg  (Negative)   04/01/17  10:34    


 


Urine Bilirubin  Neg  (Negative)   04/01/17  10:34    


 


Urine Urobilinogen  < 2.0 mg/dL (<2.0)   04/01/17  10:34    


 


Ur Leukocyte Esterase  Neg  (Negative)   04/01/17  10:34    


 


Urine WBC (Auto)  < 1.0 /HPF (0.0-6.0)   04/01/17  10:34    


 


Urine RBC (Auto)  1.0 /HPF (0.0-6.0)   04/01/17  10:34    


 


U Epithel Cells (Auto)  < 1.0 /HPF (0-13.0)   04/01/17  10:34

## 2017-04-03 NOTE — DISCHARGE SUMMARY
Providers





- Providers


Date of Admission: 


04/01/17 17:43





Date of discharge: 04/04/17


Attending physician: 


EJ MCCARTNEY





 





04/01/17


Consult to Cardiac Rehabilitation [CONS] Routine 


   Reason For Exam: Phase I





04/01/17 17:47


Consult to Physician [CONS] Routine 


   Consulting Provider: KODI WINSLOW


   Reason For Exam: CHF


   Place consult to:: cardiology


   Notified:: y


   Was contact made?: Yes











Primary care physician: 


PRIMARY CARE MD








Hospitalization


Reason for admission: chf exac


Condition: Stable


Hospital course: 





61 YO Female with HTN, MI, CAD, PUD, HLD, previous CVA and vertigo admitted for 

acute hypoxemic respiratory failure secondary to new CHF exacerbation.  CTA of 

the chest was found to be negative.  Patient has a history of ischemic 

cardiomyopathy status post CABG over 10 years ago.  Patient was found to have 

an elevated BNP and accelerated hypertension which normalized.  Patient 

received IV diuresis and troponins were found to be negative.  Patient 

clinically improved with chest x-ray and exam unremarkable after treatment.  

Patient was felt to have her see maximal hospital benefit.  Therefore, patient 

will be discharged home.  Patient is follow-up with her primary care physician.

  Dedicated discharge time 31 minutes.


Disposition: DISCHARGED TO HOME OR SELFCARE


Time spent for discharge: 31





- Discharge Diagnoses


(1) Accelerated hypertension


Status: Acute   





(2) Acute respiratory failure with hypoxemia


Status: Acute   





(3) HLD (hyperlipidemia)


Status: Acute   


Qualifiers: 


   Hyperlipidemia type: H 





(4) Paroxysmal nocturnal dyspnea


Status: Acute   





(5) Uncontrolled hypertension


Status: Acute   





(6) CHF (congestive heart failure)


Status: Suspected   


Qualifiers: 


   Congestive heart failure type: C   Congestive heart failure chronicity: C 





(7) Hyperlipidemia


Status: Chronic   


Qualifiers: 


   Hyperlipidemia type: H 


Comment: We'll add continue her simvastatin as an outpatient or HDL is 

protective we'll try to decrease her LDL below 80   





Core Measure Documentation





- Palliative Care


Palliative Care/ Comfort Measures: Not Applicable





- Core Measures


Any of the following diagnoses?: none





Exam





- Constitutional


Vitals: 


 











Temp Pulse Resp BP Pulse Ox


 


 97.5 F L  80   18   120/76   99 


 


 04/03/17 09:00  04/03/17 09:00  04/03/17 09:00  04/03/17 09:00  04/03/17 09:00











General appearance: Present: no acute distress, well-nourished





- EENT


Eyes: Present: PERRL


ENT: hearing intact, clear oral mucosa





- Neck


Neck: Present: supple, normal ROM





- Respiratory


Respiratory effort: normal


Respiratory: bilateral: diminished





- Cardiovascular


Heart Sounds: Present: S1 & S2.  Absent: rub, click





- Extremities


Extremities: pulses symmetrical, No edema


Peripheral Pulses: within normal limits





- Abdominal


General gastrointestinal: Present: soft, non-tender, non-distended, normal 

bowel sounds


Female genitourinary: Present: normal





- Integumentary


Integumentary: Present: clear, warm, dry





- Musculoskeletal


Musculoskeletal: gait normal, strength equal bilaterally





- Psychiatric


Psychiatric: appropriate mood/affect, intact judgment & insight





- Neurologic


Neurologic: CNII-XII intact, moves all extremities





Plan


Activity: no restrictions


Weight Bearing Status: Full Weight Bearing


Diet: low fat, low cholesterol, low salt


Follow up with: 


PRIMARY CARE,MD [Primary Care Provider] - 3-5 Days


TAYLOR CHAPMAN MD [Staff Physician] - 7 Days


Prescriptions: 


ALBUTEROL Inhaler [ProAir HFA Inhaler] 2 puff IH QID PRN #30 inha


 PRN Reason: Shortness Of Breath


amLODIPine [Norvasc] 10 mg PO QDAY #30 tablet


AtorvaSTATin [Lipitor] 80 mg PO QHS #30 tablet


Cilostazol [Pletal] 50 mg PO BID #60 tablet


Clopidogrel [Plavix] 75 mg PO QDAY #30 tablet


Hydrochlorothiazide [HCTZ] 25 mg PO QAM #30 tablet


Lisinopril [Zestril TAB] 40 mg PO QDAY #30 tablet


Metoprolol [Lopressor TAB] 50 mg PO BID #60 tablet

## 2017-04-03 NOTE — PROGRESS NOTE
Assessment and Plan





- Patient Problems


(1) Shortness of breath


Current Visit: Yes   Status: Acute   


Plan to address problem: 


The patient is a 62-year-old woman with way coronary bypass over 10 years ago.  

She presents with a 10-year-old bypass grafts, and acute shortness of breath.  

The patient surmises that her symptoms are due to today high environmental 

pollen count.  On chest x-ray there is cardiomegaly, but no CHF or interstitial 

edema.  She reports no ischemic cardiac evaluation in the past several years.





Due to the high clinical index of suspicion in the presence of 10-year-old 

grafts, would recommend ischemic evaluation with a thallium stress test in the 

morning








Subjective


Date of service: 04/03/17


Interval history: 





The patient is a 62-year-old woman with way coronary bypass over 10 years ago.  

She presents with a 10-year-old bypass grafts, and acute shortness of breath.  

The patient surmises that her symptoms are due to today high environmental 

pollen count.  On chest x-ray there is cardiomegaly, but no CHF or interstitial 

edema.  She reports no ischemic cardiac evaluation in the past several years.





Due to the high clinical index of suspicion in the presence of 10-year-old 

grafts, would recommend ischemic evaluation with a thallium stress test in the 

morning.





Objective


 Vital Signs











  Temp Pulse Pulse Pulse Resp BP BP


 


 04/03/17 16:47   76     


 


 04/03/17 12:00  97.4 F L    73  18   114/85


 


 04/03/17 10:00   70   70  20  


 


 04/03/17 09:00  97.5 F L    80  18  


 


 04/03/17 06:52   80     


 


 04/03/17 05:20  97.7 F   75   20   92/59


 


 04/03/17 00:50  98.1 F   72   18   123/72


 


 04/02/17 21:50   76     110/62 


 


 04/02/17 20:40  97.9 F   76   18   110/62














  BP Pulse Ox


 


 04/03/17 16:47  


 


 04/03/17 12:00  


 


 04/03/17 10:00   98


 


 04/03/17 09:00  120/76  99


 


 04/03/17 06:52  


 


 04/03/17 05:20   99


 


 04/03/17 00:50   100


 


 04/02/17 21:50  


 


 04/02/17 20:40   98














- Physical Examination


General: No Apparent Distress


HEENT: Positive: PERRL


Neck: Positive: neck supple


Cardiac: Positive: Reg Rate and Rhythm


Lungs: Positive: Decreased Breath Sounds


Neuro: Positive: Grossly Intact


Abdomen: Positive: Soft


Skin: Positive: Clear


Extremities: Absent: edema





- Labs and Meds


 CBC











  04/03/17 Range/Units





  08:49 


 


WBC  8.8  (4.5-11.0)  K/mm3


 


RBC  5.37 H  (3.65-5.03)  M/mm3


 


Hgb  15.0 H  (10.1-14.3)  gm/dl


 


Hct  45.8 H  (30.3-42.9)  %


 


Plt Count  239  (140-440)  K/mm3


 


Lymph #  2.6  (1.2-5.4)  K/mm3


 


Mono #  0.7  (0.0-0.8)  K/mm3


 


Eos #  0.3  (0.0-0.4)  K/mm3


 


Baso #  0.0  (0.0-0.1)  K/mm3








 Comprehensive Metabolic Panel











  04/03/17 Range/Units





  08:49 


 


Sodium  140  (137-145)  mmol/L


 


Potassium  3.5 L  (3.6-5.0)  mmol/L


 


Chloride  99.2  ()  mmol/L


 


Carbon Dioxide  26  (22-30)  mmol/L


 


BUN  24 H  (7-17)  mg/dL


 


Creatinine  1.5 H  (0.7-1.2)  mg/dL


 


Glucose  122 H  ()  mg/dL


 


Calcium  9.4  (8.4-10.2)  mg/dL

## 2017-04-04 NOTE — DISCHARGE SUMMARY
Providers





- Providers


Date of Admission: 


04/01/17 17:43





Date of discharge: 04/03/17


Attending physician: 


EJ MCCARTNEY





 





04/01/17


Consult to Cardiac Rehabilitation [CONS] Routine 


   Reason For Exam: Phase I





04/01/17 17:47


Consult to Physician [CONS] Routine 


   Consulting Provider: KODI WINSLOW


   Reason For Exam: CHF


   Place consult to:: cardiology


   Notified:: y


   Was contact made?: Yes











Primary care physician: 


PRIMARY CARE MD








Hospitalization


Reason for admission: CHF exac


Condition: Stable


Hospital course: 





61 YO Female with HTN, MI, CAD, PUD, HLD, previous CVA and vertigo admitted for 

acute hypoxemic respiratory failure secondary to new CHF exacerbation.  CTA of 

the chest was found to be negative.  Patient has a history of ischemic 

cardiomyopathy status post CABG over 10 years ago.  Patient was found to have 

an elevated BNP and accelerated hypertension which normalized.  Patient 

received IV diuresis and troponins were found to be negative.  Patient 

clinically improved with chest x-ray and exam unremarkable after treatment.  

Patient with a history of ischemic cardiomyopathy status post CABG over 10 

years ago.  Given the age of the CABG being greater than 10 years, cardiology 

wanted to evaluate with stress thallium to assess patency of grafts.  However, 

patient refused and signed out AMA.


 


Disposition: LEFT AGAINST MEDICAL ADVICE





- Discharge Diagnoses


(1) Accelerated hypertension


Status: Acute   





(2) Acute respiratory failure with hypoxemia


Status: Acute   





(3) HLD (hyperlipidemia)


Status: Acute   


Qualifiers: 


   Hyperlipidemia type: H 





(4) Paroxysmal nocturnal dyspnea


Status: Acute   





(5) Uncontrolled hypertension


Status: Acute   





(6) CHF (congestive heart failure)


Status: Suspected   


Qualifiers: 


   Congestive heart failure type: C   Congestive heart failure chronicity: C 





(7) Hyperlipidemia


Status: Chronic   


Qualifiers: 


   Hyperlipidemia type: H 


Comment: We'll add continue her simvastatin as an outpatient or HDL is 

protective we'll try to decrease her LDL below 80   





Core Measure Documentation





- Palliative Care


Palliative Care/ Comfort Measures: Not Applicable





- Core Measures


Any of the following diagnoses?: heart failure





- Heart Failure Discharge Requirements


ACE/ARB for LVSD if EF <40%: No


Reason for no ACE/ARB: Patient refusal


Beta blocker at discharge: No


Reason for no beta blocker on DC: Patient refusal


Heart failure comment: AMA discharge





Exam





- Constitutional


Vitals: 


 











Temp Pulse Resp BP Pulse Ox


 


 97.7 F   83   20   114/72   97 


 


 04/03/17 21:19  04/03/17 21:19  04/03/17 21:19  04/03/17 21:19  04/03/17 21:19











General appearance: Present: no acute distress, well-nourished





- EENT


Eyes: Present: PERRL


ENT: hearing intact, clear oral mucosa





- Neck


Neck: Present: supple, normal ROM





- Respiratory


Respiratory effort: normal


Respiratory: bilateral: CTA





- Cardiovascular


Heart Sounds: Present: S1 & S2.  Absent: rub, click





- Extremities


Extremities: pulses symmetrical, No edema


Peripheral Pulses: within normal limits





- Abdominal


General gastrointestinal: Present: soft, non-tender, non-distended, normal 

bowel sounds


Female genitourinary: Present: normal





- Integumentary


Integumentary: Present: clear, warm, dry





- Musculoskeletal


Musculoskeletal: gait normal, strength equal bilaterally





- Psychiatric


Psychiatric: appropriate mood/affect, intact judgment & insight





- Neurologic


Neurologic: CNII-XII intact, moves all extremities





Plan


Follow up with: 


TAYLOR CHAPMAN MD [Staff Physician] - 7 Days


PRIMARY CARE,MD [Primary Care Provider] - 3-5 Days


Prescriptions: 


ALBUTEROL Inhaler [ProAir HFA Inhaler] 2 puff IH QID PRN #30 inha


 PRN Reason: Shortness Of Breath


amLODIPine [Norvasc] 10 mg PO QDAY #30 tablet


AtorvaSTATin [Lipitor] 80 mg PO QHS #30 tablet


Cilostazol [Pletal] 50 mg PO BID #60 tablet


Clopidogrel [Plavix] 75 mg PO QDAY #30 tablet


Hydrochlorothiazide [HCTZ] 25 mg PO QAM #30 tablet


Lisinopril [Zestril TAB] 40 mg PO QDAY #30 tablet


Metoprolol [Lopressor TAB] 50 mg PO BID #60 tablet

## 2017-11-12 NOTE — XRAY REPORT
CHEST TWO VIEWS: 11/12/17 10:43:00



CLINICAL: Shortness of breath.



COMPARISON: 10/29/17



FINDINGS: Stable cardiomegaly with redistribution of pulmonary blood 

flow to the upper lobes.  The lungs are normally expanded and clear.  

Median sternotomy wires and mediastinal surgical clips.The bones and 

soft tissues are normal.



IMPRESSION: Stable cardiomegaly and pulmonary venous hypertension.No 

pulmonary edema.

## 2017-11-12 NOTE — CAT SCAN REPORT
FINAL REPORT



PROCEDURE:  CT ANGIO CHEST



TECHNIQUE:  Computerized tomographic angiography of the chest was

performed after the IV injection of iodinated nonionic contrast

including image processing. The image data was postprocessed

using 2-dimensional multiplanar reformatted (MPR) and

3-dimensional (MIP and/or volume rendered) techniques. 



HISTORY:  SOB, elevated dimer 



COMPARISON:  CTA chest dated April 1, 2017



FINDINGS:  

The metallic wire persist within 2 branches of the left pulmonary

artery in the left lower lobe of the lungs. Timing of contrast

bolus is suboptimal, as there is poor enhancement of distal

pulmonary artery branches in the lower lungs. No acute pulmonary

embolus is seen. 



There is CHF with small pleural effusions and pulmonary edema.

This has slightly worsened since prior study. A bleb is seen in

the right upper lobe of the lungs. No pneumothorax is seen.

Ascending thoracic aorta is top normal limits in size but is not

well enhanced to evaluate further. Likely mild reactive lymph

nodes are seen in the catrina, similar prior study. 



IMPRESSION:  

CHF, pulmonary edema, and small pleural effusions are seen.



There is a persistent metallic wire within 2 branches of the left

pulmonary artery in the left lower lobe of the lungs.



No acute pulmonary embolus is seen, but evaluation of distal

branches in the lower lungs is limited due to timing of contrast

bolus.

## 2017-11-12 NOTE — EMERGENCY DEPARTMENT REPORT
HPI





- General


Chief Complaint: Dyspnea/Respdistress


Time Seen by Provider: 17 11:41





- HPI


HPI: 


This is a 62 year-old  female with a past medical history of 

coronary artery disease, ischemic cardiomyopathy with an EF of 15-20%, 

hypertension and peripheral vascular disease, who presents to the emergency 

department, driving herself and be seen, with a complaint of some shortness of 

breath over the past 1-2 days.  The patient was recently admitted to [facility] after she came in with shortness of breath and chest pain secondary to 

what she believes is black mold in her house.  When she left the hospital, she 

has been staying at a friend's house, and says that the  light was not 

related and they were inhaling gas over the past few days.  She denies any 

chest pain, fever, nausea, vomiting.  She denies being a smoker.  She has a 

primary care physician but cannot remember the name.  No recent travel.  She 

has not taken anything for her symptoms prior to presentation.








ED Past Medical Hx





- Past Medical History


Previous Medical History?: Yes


Hx Hypertension: Yes


Hx Heart Attack/AMI: Yes





- Surgical History


Past Surgical History?: Yes


Hx Open Heart Surgery: Yes


Additional Surgical History: triple bypass, 





- Social History


Smoking Status: Never Smoker


Substance Use Type: Prescribed





- Medications


Home Medications: 


 Home Medications











 Medication  Instructions  Recorded  Confirmed  Last Taken  Type


 


ALBUTEROL Inhaler [ProAir HFA 2 puff IH QID PRN #30 inha 04/03/17 10/29/17 

Unknown Rx





Inhaler]     


 


AtorvaSTATin [Lipitor] 80 mg PO QHS #30 tablet 04/03/17 10/29/17 Unknown Rx


 


Cilostazol [Pletal] 50 mg PO BID #60 tablet 04/03/17 10/29/17 Unknown Rx


 


Clopidogrel [Plavix] 75 mg PO QDAY #30 tablet 04/03/17 10/29/17 Unknown Rx


 


Hydrochlorothiazide [HCTZ] 25 mg PO QAM #30 tablet 04/03/17 10/29/17 Unknown Rx


 


Metoprolol [Lopressor TAB] 50 mg PO BID #60 tablet 04/03/17 10/29/17 Unknown Rx


 


amLODIPine [Norvasc] 10 mg PO QDAY #30 tablet 04/03/17 10/29/17 Unknown Rx


 


Gabapentin [Neurontin] 300 mg PO BID #60 cap 17  Unknown Rx


 


Lisinopril [Zestril TAB] 5 mg PO QDAY #30 tablet 17  Unknown Rx














ED Review of Systems


ROS: 


Stated complaint: DIFFICULTY BREATHING


Other details as noted in HPI





Comment: All other systems reviewed and negative


Constitutional: denies: chills, fever


Eyes: denies: eye pain, eye discharge, vision change


ENT: denies: ear pain, throat pain


Respiratory: shortness of breath.  denies: cough


Cardiovascular: denies: chest pain, edema


Gastrointestinal: denies: abdominal pain, nausea, diarrhea


Genitourinary: denies: urgency, dysuria, discharge


Musculoskeletal: denies: back pain, joint swelling, arthralgia


Skin: denies: rash, lesions


Neurological: denies: headache, weakness, paresthesias





Physical Exam





- Physical Exam


Vital Signs: 


 Vital Signs











  17





  10:53


 


Temperature 97.4 F L


 


Pulse Rate 63


 


Respiratory 22





Rate 


 


Blood Pressure 146/98


 


O2 Sat by Pulse 98





Oximetry 














ED Course


 Vital Signs











  17





  10:53


 


Temperature 97.4 F L


 


Pulse Rate 63


 


Respiratory 22





Rate 


 


Blood Pressure 146/98


 


O2 Sat by Pulse 98





Oximetry 














ED Medical Decision Making





- Lab Data


Result diagrams: 


 17 10:59





 17 10:59





- EKG Data


-: EKG Interpreted by Me


EKG shows normal: sinus rhythm (with PVCs), axis, intervals, QRS complexes (LVH)

, ST-T waves (nonspecific ST-T waves)


Rate: normal





- EKG Data


When compared to previous EKG there are: previous EKG unavailable


Interpretation: other (sinus rhythm with PVCs, LVH, nonspecific ST-T waves)





- Radiology Data


Radiology results: report reviewed, image reviewed


interpreted by me: 


Chest x-ray shows some mild cardiomegaly and pulmonary vascular congestion.  No 

overt pleural effusions.  No pneumonia.








PROCEDURE: CT ANGIO CHEST 





TECHNIQUE: Computerized tomographic angiography of the chest was 


performed after the IV injection of iodinated nonionic contrast 


including image processing. The image data was postprocessed 


using 2-dimensional multiplanar reformatted (MPR) and 


3-dimensional (MIP and/or volume rendered) techniques. 





HISTORY: SOB, elevated dimer 





COMPARISON: CTA chest dated 2017 





FINDINGS: 


The metallic wire persist within 2 branches of the left pulmonary 


artery in the left lower lobe of the lungs. Timing of contrast 


bolus is suboptimal, as there is poor enhancement of distal 


pulmonary artery branches in the lower lungs. No acute pulmonary 


embolus is seen. 





There is CHF with small pleural effusions and pulmonary edema. 


This has slightly worsened since prior study. A bleb is seen in 


the right upper lobe of the lungs. No pneumothorax is seen. 


Ascending thoracic aorta is top normal limits in size but is not 


well enhanced to evaluate further. Likely mild reactive lymph 


nodes are seen in the catrina, similar prior study. 





IMPRESSION: 


CHF, pulmonary edema, and small pleural effusions are seen. 





There is a persistent metallic wire within 2 branches of the left 


pulmonary artery in the left lower lobe of the lungs. 





No acute pulmonary embolus is seen, but evaluation of distal 


branches in the lower lungs is limited due to timing of contrast 


bolus. 





- Medical Decision Making


The patient originally came in with the complaint of shortness of breath but 

she thought it was related to possible gas inhalation.  Carboxyhemoglobin was 

almost negligible.  Chest x-ray at first did not show any pneumonia or any 

obvious or significant CHF.  However the patient had an elevated d-dimer and CT 

angiography showed hypervolemia, vascular congestion and pleural effusions and 

the patient has a BNP of 14,000.  She was given some Lasix for diuresis.  She 

was given breathing treatments.  She will be admitted for further evaluation 

and treatment. Accepted for admission by the hospitalist, Dr Montgomery. 








- Differential Diagnosis


COPD, CHF, CO poisoning, Pneumonia


Critical Care Time: No


Critical care attestation.: 


If time is entered above; I have spent that time in minutes in the direct care 

of this critically ill patient, excluding procedure time.








ED Disposition


Clinical Impression: 


 Shortness of breath





Dyspnea


Qualifiers:


 Dyspnea type: shortness of breath Qualified Code(s): R06.02 - Shortness of 

breath; R06.00 - Dyspnea, unspecified; R06.01 - Orthopnea





Acute exacerbation of CHF (congestive heart failure)


Qualifiers:


 Congestive heart failure type: unspecified congestive heart failure type 

Qualified Code(s): I50.9 - Heart failure, unspecified





Disposition:  OP ADMIT IP TO THIS HOSP


Is pt being admited?: Yes


Condition: Stable


Time of Disposition: 18:21

## 2017-11-12 NOTE — HISTORY AND PHYSICAL REPORT
History of Present Illness


Chief complaint: 


In cant breathe





History of present illness: 





61 YO Female with HTN, MI, CAD S/P CABG, PUD, HLD, Vertigo, Systolic CHF(EF 15%

) presents to ED for evaluation. Pt states that she has been experiencing 

shortness of breath for the past two days, with worsening symptoms over the 

past 1 day. Pt acknowledges orthopnea, PND, and bilateral leg swelling as well 

as noncompliance with low sodium diet. Pt denies productive cough, fever, chills

, CP, Palpitations, NVD, syncope, vertigo, trauma, recent ill contacts, 

prolonged immobility/travel, individual/family history of DVT/PE, medication 

noncompliance. Pt seen and evaluated in ED and found to be in respiratory 

distress, and placed on supplemental oxygen. 











Past History


Past Medical History: acute MI, CAD, heart failure, hypertension


Past Surgical History: CABG, 


Social history: single


Family history: diabetes, hypertension





Medications and Allergies


 Allergies











Allergy/AdvReac Type Severity Reaction Status Date / Time


 


No Known Allergies Allergy   Verified 09/03/15 03:02











 Home Medications











 Medication  Instructions  Recorded  Confirmed  Last Taken  Type


 


ALBUTEROL Inhaler [ProAir HFA 2 puff IH QID PRN #30 inha 04/03/17 10/29/17 

Unknown Rx





Inhaler]     


 


AtorvaSTATin [Lipitor] 80 mg PO QHS #30 tablet 04/03/17 10/29/17 Unknown Rx


 


Cilostazol [Pletal] 50 mg PO BID #60 tablet 04/03/17 10/29/17 Unknown Rx


 


Clopidogrel [Plavix] 75 mg PO QDAY #30 tablet 04/03/17 10/29/17 Unknown Rx


 


Hydrochlorothiazide [HCTZ] 25 mg PO QAM #30 tablet 04/03/17 10/29/17 Unknown Rx


 


Metoprolol [Lopressor TAB] 50 mg PO BID #60 tablet 04/03/17 10/29/17 Unknown Rx


 


amLODIPine [Norvasc] 10 mg PO QDAY #30 tablet 04/03/17 10/29/17 Unknown Rx


 


Gabapentin [Neurontin] 300 mg PO BID #60 cap 17  Unknown Rx


 


Lisinopril [Zestril TAB] 5 mg PO QDAY #30 tablet 17  Unknown Rx














Review of Systems


Constitutional: weight gain, no weight loss, no fever, no chills, no sweats


Ears, nose, mouth and throat: no ear pain, no ear discharge, no tinnitis, no 

decreased hearing, no nose pain, no nasal congestion, no nasal discharge, no 

sinus pressure


Breasts: no change in shape, no swelling, no mass


Cardiovascular: orthopnea, edema, shortness of breath, dyspnea on exertion, 

paroxysmal nocturnal dyspnea, leg edema, no chest pain


Respiratory: no cough, no cough with sputum, no excessive sputum, no hemoptysis


Gastrointestinal: no abdominal pain, no nausea, no vomiting, no diarrhea, no 

constipation


Genitourinary Female: no pelvic pain, no flank pain, no menorrhagia, no dysuria

, no urinary frequency, no urgency


Rectal: no pain, no incontinence, no bleeding


Musculoskeletal: no neck stiffness, no neck pain, no shooting arm pain, no arm 

numbness/tingling, no low back pain, no shooting leg pain


Integumentary: no rash, no pruritis, no redness, no sores


Neurological: no head injury, no transient paralysis, no paralysis, no weakness

, no parathesias, no numbness, no tingling, no seizures, no syncope


Psychiatric: no anxiety, no memory loss, no change in sleep habits, no sleep 

disturbances, no insomnia, no hypersomnia, no change in appetite, no change in 

libido


Endocrine: no cold intolerance, no heat intolerance, no polyphagia, no 

excessive thirst, no polydipsia, no polyuria, no nocturia


Hematologic/Lymphatic: no easy bruising, no easy bleeding


Allergic/Immunologic: no urticaria, no allergic rhinitis, no wheezing





Exam





- Constitutional


Vitals: 


 











Temp Pulse Resp BP Pulse Ox


 


 97.4 F L  97 H  17   142/93   100 


 


 17 10:53  17 14:30  17 14:30  17 14:30  17 14:30











General appearance: Present: mild distress





- EENT


Eyes: Present: PERRL


ENT: hearing intact, clear oral mucosa





- Neck


Neck: Present: supple, normal ROM, masses or JVD





- Respiratory


Respiratory effort: labored


Respiratory: bilateral: diminished, rhonchi





- Cardiovascular


Heart Sounds: Present: S1 & S2.  Absent: rub, click





- Extremities


Extremities: pulses symmetrical, No edema


Extremity abnormal: edema


Peripheral Pulses: within normal limits





- Abdominal


General gastrointestinal: Present: soft, non-tender, non-distended, normal 

bowel sounds


Female genitourinary: Present: normal





- Integumentary


Integumentary: Present: clear, dry





- Musculoskeletal


Musculoskeletal: generalized weakness





- Psychiatric


Psychiatric: appropriate mood/affect, intact judgment & insight





- Neurologic


Neurologic: CNII-XII intact, moves all extremities





Results





- Labs


CBC & Chem 7: 


 17 10:59





 17 10:59


Labs: 


 Abnormal lab results











  17 Range/Units





  10:59 10:59 10:59 


 


RBC   5.21 H   (3.65-5.03)  M/mm3


 


Hgb   15.0 H   (10.1-14.3)  gm/dl


 


Hct   47.1 H   (30.3-42.9)  %


 


RDW   16.1 H   (13.2-15.2)  %


 


Mono % (Auto)   9.3 H   (0.0-7.3)  %


 


D-Dimer     (0-234)  ng/mlDDU


 


Carbon Dioxide  20 L    (22-30)  mmol/L


 


BUN  26 H    (7-17)  mg/dL


 


Glucose  113 H    ()  mg/dL


 


NT-Pro-B Natriuret Pep    98099 H  (0-900)  pg/mL














  17 Range/Units





  11:59 


 


RBC   (3.65-5.03)  M/mm3


 


Hgb   (10.1-14.3)  gm/dl


 


Hct   (30.3-42.9)  %


 


RDW   (13.2-15.2)  %


 


Mono % (Auto)   (0.0-7.3)  %


 


D-Dimer  1606.88 H  (0-234)  ng/mlDDU


 


Carbon Dioxide   (22-30)  mmol/L


 


BUN   (7-17)  mg/dL


 


Glucose   ()  mg/dL


 


NT-Pro-B Natriuret Pep   (0-900)  pg/mL














Assessment and Plan





- Patient Problems


(1) Acute exacerbation of CHF (congestive heart failure)


Current Visit: Yes   Status: Acute   


Qualifiers: 


   Congestive heart failure type: unspecified congestive heart failure type   

Qualified Code(s): I50.9 - Heart failure, unspecified   


Plan to address problem: 


Admit to telemetry, afterload reduction, fluid restriction, monitor uop q shift

, monitor negative fluid balance, diuresis, low sodium diet, daily weight, 

Cardiology consulted, thyroid panel








(2) CAD (coronary artery disease)


Current Visit: Yes   Status: Acute   


Qualifiers: 


   Coronary Disease-Associated Artery/Lesion type: native artery   Native vs. 

transplanted heart: native heart   Associated angina: A 


Plan to address problem: 


Anti platelet therapy, statin therapy, telemetry monitoring, low cholesterol 

diet. 








(3) Acute respiratory failure with hypoxemia


Current Visit: No   Status: Acute   


Plan to address problem: 


supplemental oxygen, nebulizer therapy, NIPPV as clinically indicated, diuresis

, treat chf.








(4) Uncontrolled hypertension


Current Visit: No   Status: Acute   


Plan to address problem: 


monotor BP q shift, Ace inhibitor and beta blocker therapy








(5) DVT prophylaxis


Current Visit: No   Status: Acute

## 2017-11-13 NOTE — PROGRESS NOTE
<ERIC LOPEZ - Last Filed: 11/13/17 15:00>





Assessment and Plan


Assessment and plan: 


 Patient is a 61 YO Female with HTN, MI, CAD S/P CABG, PUD, HLD, Vertigo, 

Systolic CHF(EF 15%) presents to ED for evaluation. Pt states that she has been 

experiencing shortness of breath for the past two days, with worsening symptoms 

over the past 1 day.





Acute respiratory failure with hypoxia


Patient oxygen saturation improved with 2LNC; currently SPO2 98%. No acute 

respiratory distress noted. 


Aggressive Nebulizers/Inhalers


ABG when necessary


Oxygen supplement


Supportive care 





Acute on chronic diastolic Congestive heart failure


Echocardiogram 


IVdiuresis, beta blockers and ACEI/ARB 


Strict I&O's and daily weights


Low-sodium/cardiac diet/fluid restriction 


Closely monitor electrolytes


Cardiology evaluation





CAD (coronary artery disease)


Continue Anti platelet therapy, statin therapy, 


Low cholesterol diet. 


Telemetry monitoring,





Uncontrolled hypertension


Continue on home antihypertensive medication


Closely monitor Blood pressure 





DVT prophylaxis


Lovenox 








History


Interval history: 





Patient denies chest pain,shortness of breath or dizziness. labs and nursing 

notes reviewed. 





Hospitalist Physical





- Constitutional


Vitals: 


 











Temp Pulse Resp BP Pulse Ox


 


 97.5 F L  88   21   121/82   100 


 


 11/13/17 04:03  11/13/17 04:03  11/13/17 04:03  11/13/17 04:03  11/13/17 04:03











General appearance: Present: mild distress





- EENT


Eyes: Present: PERRL


ENT: hearing intact





- Neck


Neck: Present: supple





- Respiratory


Respiratory effort: normal


Respiratory: bilateral: CTA





- Cardiovascular


Rhythm: regular


Heart Sounds: Present: S1 & S2





- Abdominal


General gastrointestinal: soft, non-tender





- Integumentary


Integumentary: Present: clear, warm, dry





- Psychiatric


Psychiatric: appropriate mood/affect





- Neurologic


Neurologic: CNII-XII intact





- Allied Health


Allied health notes reviewed: nursing





Results





- Labs


CBC & Chem 7: 


 11/12/17 10:59





 11/12/17 10:59


Labs: 


 Laboratory Last Values











WBC  8.9 K/mm3 (4.5-11.0)   11/12/17  10:59    


 


RBC  5.21 M/mm3 (3.65-5.03)  H  11/12/17  10:59    


 


Hgb  15.0 gm/dl (10.1-14.3)  H  11/12/17  10:59    


 


Hct  47.1 % (30.3-42.9)  H  11/12/17  10:59    


 


MCV  90 fl (79-97)   11/12/17  10:59    


 


MCH  29 pg (28-32)   11/12/17  10:59    


 


MCHC  32 % (30-34)   11/12/17  10:59    


 


RDW  16.1 % (13.2-15.2)  H  11/12/17  10:59    


 


Plt Count  273 K/mm3 (140-440)   11/12/17  10:59    


 


Lymph % (Auto)  23.6 % (13.4-35.0)   11/12/17  10:59    


 


Mono % (Auto)  9.3 % (0.0-7.3)  H  11/12/17  10:59    


 


Eos % (Auto)  0.4 % (0.0-4.3)   11/12/17  10:59    


 


Baso % (Auto)  0.8 % (0.0-1.8)   11/12/17  10:59    


 


Lymph #  2.1 K/mm3 (1.2-5.4)   11/12/17  10:59    


 


Mono #  0.8 K/mm3 (0.0-0.8)   11/12/17  10:59    


 


Eos #  0.0 K/mm3 (0.0-0.4)   11/12/17  10:59    


 


Baso #  0.1 K/mm3 (0.0-0.1)   11/12/17  10:59    


 


Seg Neutrophils %  65.9 % (40.0-70.0)   11/12/17  10:59    


 


Seg Neutrophils #  5.9 K/mm3 (1.8-7.7)   11/12/17  10:59    


 


D-Dimer  1606.88 ng/mlDDU (0-234)  H  11/12/17  11:59    


 


POC ABG pH  7.441  (7.35-7.45)   11/12/17  20:41    


 


POC ABG pCO2  30.1  (35-45)  L  11/12/17  20:41    


 


POC ABG pO2  143  ()  H  11/12/17  20:41    


 


POC ABG HCO3  20.5   11/12/17  20:41    


 


POC ABG Total CO2  21   11/12/17  20:41    


 


POC ABG O2 Sat  99   11/12/17  20:41    


 


POC ABG Base Excess  -4   11/12/17  20:41    


 


Carboxyhemoglobin  4.2   11/12/17  12:27    


 


FiO2  3 %  11/12/17  20:41    


 


Sodium  139 mmol/L (137-145)   11/12/17  10:59    


 


Potassium  4.3 mmol/L (3.6-5.0)   11/12/17  10:59    


 


Chloride  102.8 mmol/L ()   11/12/17  10:59    


 


Carbon Dioxide  20 mmol/L (22-30)  L  11/12/17  10:59    


 


Anion Gap  21 mmol/L  11/12/17  10:59    


 


BUN  26 mg/dL (7-17)  H  11/12/17  10:59    


 


Creatinine  1.1 mg/dL (0.7-1.2)   11/12/17  10:59    


 


Estimated GFR  > 60 ml/min  11/12/17  10:59    


 


BUN/Creatinine Ratio  24 %  11/12/17  10:59    


 


Glucose  113 mg/dL ()  H  11/12/17  10:59    


 


Calcium  9.1 mg/dL (8.4-10.2)   11/12/17  10:59    


 


Troponin T  < 0.010 ng/mL (0.00-0.029)   11/12/17  10:59    


 


NT-Pro-B Natriuret Pep  40836 pg/mL (0-900)  H  11/12/17  10:59    


 


TSH  2.060 mlU/mL (0.270-4.200)   11/12/17  20:38    


 


Free T4  1.65 ng/dL (0.76-1.46)  H  11/12/17  20:38    














<KOCHERLA,AMY J - Last Filed: 11/13/17 16:34>





Assessment and Plan


Assessment and plan: 





I saw and evaluated the patient. I agree with the findings and the plan of care 

as documented in the Nurse Practitioner's~note, with the following corrections 

and additions.


Follow-up cardiology evaluation and recommendations


Patient reports that she is homeless 


Discuss with case management; for possible placement


Continue current management





Hospitalist Physical





- Constitutional


Vitals: 


 











Temp Pulse Resp BP Pulse Ox


 


 97.5 F L  99 H  21   108/80   100 


 


 11/13/17 04:03  11/13/17 11:36  11/13/17 04:03  11/13/17 11:36  11/13/17 04:03














Results





- Labs


CBC & Chem 7: 


 11/12/17 10:59





 11/12/17 10:59


Labs: 


 Laboratory Last Values











WBC  8.9 K/mm3 (4.5-11.0)   11/12/17  10:59    


 


RBC  5.21 M/mm3 (3.65-5.03)  H  11/12/17  10:59    


 


Hgb  15.0 gm/dl (10.1-14.3)  H  11/12/17  10:59    


 


Hct  47.1 % (30.3-42.9)  H  11/12/17  10:59    


 


MCV  90 fl (79-97)   11/12/17  10:59    


 


MCH  29 pg (28-32)   11/12/17  10:59    


 


MCHC  32 % (30-34)   11/12/17  10:59    


 


RDW  16.1 % (13.2-15.2)  H  11/12/17  10:59    


 


Plt Count  273 K/mm3 (140-440)   11/12/17  10:59    


 


Lymph % (Auto)  23.6 % (13.4-35.0)   11/12/17  10:59    


 


Mono % (Auto)  9.3 % (0.0-7.3)  H  11/12/17  10:59    


 


Eos % (Auto)  0.4 % (0.0-4.3)   11/12/17  10:59    


 


Baso % (Auto)  0.8 % (0.0-1.8)   11/12/17  10:59    


 


Lymph #  2.1 K/mm3 (1.2-5.4)   11/12/17  10:59    


 


Mono #  0.8 K/mm3 (0.0-0.8)   11/12/17  10:59    


 


Eos #  0.0 K/mm3 (0.0-0.4)   11/12/17  10:59    


 


Baso #  0.1 K/mm3 (0.0-0.1)   11/12/17  10:59    


 


Seg Neutrophils %  65.9 % (40.0-70.0)   11/12/17  10:59    


 


Seg Neutrophils #  5.9 K/mm3 (1.8-7.7)   11/12/17  10:59    


 


D-Dimer  1606.88 ng/mlDDU (0-234)  H  11/12/17  11:59    


 


POC ABG pH  7.441  (7.35-7.45)   11/12/17  20:41    


 


POC ABG pCO2  30.1  (35-45)  L  11/12/17  20:41    


 


POC ABG pO2  143  ()  H  11/12/17  20:41    


 


POC ABG HCO3  20.5   11/12/17  20:41    


 


POC ABG Total CO2  21   11/12/17  20:41    


 


POC ABG O2 Sat  99   11/12/17  20:41    


 


POC ABG Base Excess  -4   11/12/17  20:41    


 


Carboxyhemoglobin  4.2   11/12/17  12:27    


 


FiO2  3 %  11/12/17  20:41    


 


Sodium  139 mmol/L (137-145)   11/12/17  10:59    


 


Potassium  4.3 mmol/L (3.6-5.0)   11/12/17  10:59    


 


Chloride  102.8 mmol/L ()   11/12/17  10:59    


 


Carbon Dioxide  20 mmol/L (22-30)  L  11/12/17  10:59    


 


Anion Gap  21 mmol/L  11/12/17  10:59    


 


BUN  26 mg/dL (7-17)  H  11/12/17  10:59    


 


Creatinine  1.1 mg/dL (0.7-1.2)   11/12/17  10:59    


 


Estimated GFR  > 60 ml/min  11/12/17  10:59    


 


BUN/Creatinine Ratio  24 %  11/12/17  10:59    


 


Glucose  113 mg/dL ()  H  11/12/17  10:59    


 


Calcium  9.1 mg/dL (8.4-10.2)   11/12/17  10:59    


 


Troponin T  < 0.010 ng/mL (0.00-0.029)   11/12/17  10:59    


 


NT-Pro-B Natriuret Pep  87694 pg/mL (0-900)  H  11/12/17  10:59    


 


TSH  2.060 mlU/mL (0.270-4.200)   11/12/17  20:38    


 


Free T4  1.65 ng/dL (0.76-1.46)  H  11/12/17  20:38

## 2017-11-14 NOTE — CONSULTATION
History of Present Illness


Consult date: 17


Consult reason: congestive heart failure


History of present illness: 





This is a 62yr old woman who has a history of 3 vessel coronary artery bypass 

grafting over 10 years ago who presented to this hospital with shortness of 

breath, admitted with congestive heart failure. Cardiac consultation was 

requested for further evaluation. A month ago, patient had an echocardiogram 

done that reports a decreased left ventricular systolic function, ejection 

fraction 15-20%. Patient has not had any recent ischemic cardiac evaluation. 

She does not have a cardiologist as an outpatient.





Past History


Past Medical History: acute MI, CAD, heart failure, hypertension


Past Surgical History: CABG, 


Social history: single


Family history: diabetes, hypertension





Medications and Allergies


 Allergies











Allergy/AdvReac Type Severity Reaction Status Date / Time


 


No Known Allergies Allergy   Verified 09/03/15 03:02











 Home Medications











 Medication  Instructions  Recorded  Confirmed  Last Taken  Type


 


ALBUTEROL Inhaler [ProAir HFA 2 puff IH QID PRN #30 inha 17 1 

Day Ago Rx





Inhaler]     


 


AtorvaSTATin [Lipitor] 80 mg PO QHS #30 tablet 17 1 Day Ago Rx


 


Cilostazol [Pletal] 50 mg PO BID #60 tablet 17 1 Day Ago Rx


 


Clopidogrel [Plavix] 75 mg PO QDAY #30 tablet 17 1 Day Ago Rx


 


Hydrochlorothiazide [HCTZ] 25 mg PO QAM #30 tablet 17 1 Day Ago 

Rx


 


Metoprolol [Lopressor TAB] 50 mg PO BID #60 tablet 17 1 Day Ago 

Rx


 


amLODIPine [Norvasc] 10 mg PO QDAY #30 tablet 17 1 Day Ago Rx


 


Gabapentin [Neurontin] 300 mg PO BID #60 cap 17 1 Day Ago Rx


 


Lisinopril [Zestril TAB] 5 mg PO QDAY #30 tablet 17 1 Day Ago Rx











Active Meds: 


Active Medications





Acetaminophen (Tylenol)  650 mg PO Q4H PRN


   PRN Reason: Pain MILD(1-3)/Fever >100.5/HA


Albuterol (Proventil)  2.5 mg IH Q4HRT PRN


   PRN Reason: Shortness Of Breath


Amlodipine Besylate (Norvasc)  10 mg PO QDAY [facility]


   Last Admin: 17 11:36 Dose:  10 mg


Aspirin (Baby Aspirin)  81 mg PO QDAY [facility]


Atorvastatin Calcium (Lipitor)  80 mg PO QHS [facility]


   Last Admin: 17 23:00 Dose:  80 mg


Carvedilol (Coreg)  3.125 mg PO BID [facility]


Furosemide (Lasix)  40 mg IV 0600,1800 [facility]


   Last Admin: 17 08:13 Dose:  Not Given


Gabapentin (Neurontin)  300 mg PO BID [facility]


   Last Admin: 17 22:15 Dose:  300 mg


Lisinopril (Zestril)  5 mg PO QDAY [facility]


   Last Admin: 17 11:35 Dose:  5 mg


Ondansetron HCl (Zofran)  4 mg IV Q8H PRN


   PRN Reason: N/V unrelieved by Reglan











Physical Examination


 Vital Signs











Temp Pulse Resp BP Pulse Ox


 


 97.4 F L  63   22   146/98   98 


 


 17 10:53  17 10:53  17 10:53  17 10:53  17 10:53











General appearance: no acute distress


HEENT: Positive: PERRL


Neck: Positive: trachea midline


Cardiac: Positive: Reg Rate and Rhythm


Lungs: Positive: Decreased Breath Sounds


Neuro: Positive: Grossly Intact


Extremities: Absent: edema





Results





 17 10:59





 17 10:59





Assessment and Plan





Acute CHF exacerbation


   EF 15-20% on recent echo


Hx of CAD with remote 3v CABG


Hypertension

## 2017-11-14 NOTE — PROGRESS NOTE
<ERIC LOPEZ - Last Filed: 11/14/17 14:18>





Assessment and Plan


Assessment and plan: 





 Patient is a 63 YO Female with HTN, MI, CAD S/P CABG, PUD, HLD, Vertigo, 

Systolic CHF(EF 15%) presents to ED for evaluation. Pt states that she has been 

experiencing shortness of breath for the past two days, with worsening symptoms 

over the past 1 day.





Acute respiratory failure with hypoxia


Patient oxygen saturation improved with 2LNC; currently SPO2 98%. No acute 

respiratory distress noted. 


Aggressive Nebulizers/Inhalers


ABG when necessary


Oxygen supplement


Supportive care 





Acute on chronic systolic Congestive heart failure


Recent Echocardiogram with severe ischemic cardiomyopathy, ejection fraction 15-

20% 


IVdiuresis, beta blockers and ACEI/ARB 


Strict I&O's and daily weights


Low-sodium/cardiac diet/fluid restriction 


Closely monitor electrolytes


Patient will has cardiac catheterization as soon as fluid overload is resolved 

per cardiology


Cardiology evaluation





CAD (coronary artery disease)


Continue Anti platelet therapy, statin therapy, 


Low cholesterol diet. 


Telemetry monitoring,





Uncontrolled hypertension


Continue on home antihypertensive medication


Closely monitor Blood pressure 





Placement


Patient reports that she is homeless 


Discuss with case management; for possible placement





DVT prophylaxis


Lovenox 











History


Interval history: 





Patient denies chest pain,shortness of breath or dizziness. labs and nursing 

notes reviewed. 








Hospitalist Physical





- Constitutional


Vitals: 


 











Temp Pulse Resp BP Pulse Ox


 


 97.6 F   88   20   116/87   100 


 


 11/14/17 04:33  11/14/17 06:23  11/14/17 04:33  11/14/17 04:33  11/14/17 04:33











General appearance: Present: mild distress





- EENT


Eyes: Present: PERRL


ENT: hearing intact





- Neck


Neck: Present: supple





- Respiratory


Respiratory effort: normal


Respiratory: bilateral: rales





- Cardiovascular


Rhythm: regular


Heart Sounds: Present: S1 & S2





- Abdominal


General gastrointestinal: soft, non-tender





- Integumentary


Integumentary: Present: clear, warm, dry





- Psychiatric


Psychiatric: appropriate mood/affect





- Neurologic


Neurologic: moves all extremities





- Allied Health


Allied health notes reviewed: nursing





Results





- Labs


CBC & Chem 7: 


 11/12/17 10:59





 11/12/17 10:59


Labs: 


 Laboratory Last Values











WBC  8.9 K/mm3 (4.5-11.0)   11/12/17  10:59    


 


RBC  5.21 M/mm3 (3.65-5.03)  H  11/12/17  10:59    


 


Hgb  15.0 gm/dl (10.1-14.3)  H  11/12/17  10:59    


 


Hct  47.1 % (30.3-42.9)  H  11/12/17  10:59    


 


MCV  90 fl (79-97)   11/12/17  10:59    


 


MCH  29 pg (28-32)   11/12/17  10:59    


 


MCHC  32 % (30-34)   11/12/17  10:59    


 


RDW  16.1 % (13.2-15.2)  H  11/12/17  10:59    


 


Plt Count  273 K/mm3 (140-440)   11/12/17  10:59    


 


Lymph % (Auto)  23.6 % (13.4-35.0)   11/12/17  10:59    


 


Mono % (Auto)  9.3 % (0.0-7.3)  H  11/12/17  10:59    


 


Eos % (Auto)  0.4 % (0.0-4.3)   11/12/17  10:59    


 


Baso % (Auto)  0.8 % (0.0-1.8)   11/12/17  10:59    


 


Lymph #  2.1 K/mm3 (1.2-5.4)   11/12/17  10:59    


 


Mono #  0.8 K/mm3 (0.0-0.8)   11/12/17  10:59    


 


Eos #  0.0 K/mm3 (0.0-0.4)   11/12/17  10:59    


 


Baso #  0.1 K/mm3 (0.0-0.1)   11/12/17  10:59    


 


Seg Neutrophils %  65.9 % (40.0-70.0)   11/12/17  10:59    


 


Seg Neutrophils #  5.9 K/mm3 (1.8-7.7)   11/12/17  10:59    


 


D-Dimer  1606.88 ng/mlDDU (0-234)  H  11/12/17  11:59    


 


POC ABG pH  7.441  (7.35-7.45)   11/12/17  20:41    


 


POC ABG pCO2  30.1  (35-45)  L  11/12/17  20:41    


 


POC ABG pO2  143  ()  H  11/12/17  20:41    


 


POC ABG HCO3  20.5   11/12/17  20:41    


 


POC ABG Total CO2  21   11/12/17  20:41    


 


POC ABG O2 Sat  99   11/12/17  20:41    


 


POC ABG Base Excess  -4   11/12/17  20:41    


 


Carboxyhemoglobin  4.2   11/12/17  12:27    


 


FiO2  3 %  11/12/17  20:41    


 


Sodium  139 mmol/L (137-145)   11/12/17  10:59    


 


Potassium  4.3 mmol/L (3.6-5.0)   11/12/17  10:59    


 


Chloride  102.8 mmol/L ()   11/12/17  10:59    


 


Carbon Dioxide  20 mmol/L (22-30)  L  11/12/17  10:59    


 


Anion Gap  21 mmol/L  11/12/17  10:59    


 


BUN  26 mg/dL (7-17)  H  11/12/17  10:59    


 


Creatinine  1.1 mg/dL (0.7-1.2)   11/12/17  10:59    


 


Estimated GFR  > 60 ml/min  11/12/17  10:59    


 


BUN/Creatinine Ratio  24 %  11/12/17  10:59    


 


Glucose  113 mg/dL ()  H  11/12/17  10:59    


 


Calcium  9.1 mg/dL (8.4-10.2)   11/12/17  10:59    


 


Troponin T  < 0.010 ng/mL (0.00-0.029)   11/12/17  10:59    


 


NT-Pro-B Natriuret Pep  52354 pg/mL (0-900)  H  11/12/17  10:59    


 


TSH  2.060 mlU/mL (0.270-4.200)   11/12/17  20:38    


 


Free T4  1.65 ng/dL (0.76-1.46)  H  11/12/17  20:38    














<KOCHERLA,AMY J - Last Filed: 11/14/17 17:29>





Assessment and Plan


Assessment and plan: 





I saw and evaluated the patient. I agree with the findings and the plan of care 

as documented in the Nurse Practitioner's~note, with the following corrections 

and additions.


Discussed the case management, for placement, assistance with discharge planning





Hospitalist Physical





- Constitutional


Vitals: 


 











Temp Pulse Resp BP Pulse Ox


 


 97.6 F   66   18   124/86   100 


 


 11/14/17 04:33  11/14/17 14:10  11/14/17 10:00  11/14/17 14:10  11/14/17 04:33














Results





- Labs


CBC & Chem 7: 


 11/12/17 10:59





 11/12/17 10:59


Labs: 


 Laboratory Last Values











WBC  8.9 K/mm3 (4.5-11.0)   11/12/17  10:59    


 


RBC  5.21 M/mm3 (3.65-5.03)  H  11/12/17  10:59    


 


Hgb  15.0 gm/dl (10.1-14.3)  H  11/12/17  10:59    


 


Hct  47.1 % (30.3-42.9)  H  11/12/17  10:59    


 


MCV  90 fl (79-97)   11/12/17  10:59    


 


MCH  29 pg (28-32)   11/12/17  10:59    


 


MCHC  32 % (30-34)   11/12/17  10:59    


 


RDW  16.1 % (13.2-15.2)  H  11/12/17  10:59    


 


Plt Count  273 K/mm3 (140-440)   11/12/17  10:59    


 


Lymph % (Auto)  23.6 % (13.4-35.0)   11/12/17  10:59    


 


Mono % (Auto)  9.3 % (0.0-7.3)  H  11/12/17  10:59    


 


Eos % (Auto)  0.4 % (0.0-4.3)   11/12/17  10:59    


 


Baso % (Auto)  0.8 % (0.0-1.8)   11/12/17  10:59    


 


Lymph #  2.1 K/mm3 (1.2-5.4)   11/12/17  10:59    


 


Mono #  0.8 K/mm3 (0.0-0.8)   11/12/17  10:59    


 


Eos #  0.0 K/mm3 (0.0-0.4)   11/12/17  10:59    


 


Baso #  0.1 K/mm3 (0.0-0.1)   11/12/17  10:59    


 


Seg Neutrophils %  65.9 % (40.0-70.0)   11/12/17  10:59    


 


Seg Neutrophils #  5.9 K/mm3 (1.8-7.7)   11/12/17  10:59    


 


D-Dimer  1606.88 ng/mlDDU (0-234)  H  11/12/17  11:59    


 


POC ABG pH  7.441  (7.35-7.45)   11/12/17  20:41    


 


POC ABG pCO2  30.1  (35-45)  L  11/12/17  20:41    


 


POC ABG pO2  143  ()  H  11/12/17  20:41    


 


POC ABG HCO3  20.5   11/12/17  20:41    


 


POC ABG Total CO2  21   11/12/17  20:41    


 


POC ABG O2 Sat  99   11/12/17  20:41    


 


POC ABG Base Excess  -4   11/12/17  20:41    


 


Carboxyhemoglobin  4.2   11/12/17  12:27    


 


FiO2  3 %  11/12/17  20:41    


 


Sodium  139 mmol/L (137-145)   11/12/17  10:59    


 


Potassium  4.3 mmol/L (3.6-5.0)   11/12/17  10:59    


 


Chloride  102.8 mmol/L ()   11/12/17  10:59    


 


Carbon Dioxide  20 mmol/L (22-30)  L  11/12/17  10:59    


 


Anion Gap  21 mmol/L  11/12/17  10:59    


 


BUN  26 mg/dL (7-17)  H  11/12/17  10:59    


 


Creatinine  1.1 mg/dL (0.7-1.2)   11/12/17  10:59    


 


Estimated GFR  > 60 ml/min  11/12/17  10:59    


 


BUN/Creatinine Ratio  24 %  11/12/17  10:59    


 


Glucose  113 mg/dL ()  H  11/12/17  10:59    


 


Calcium  9.1 mg/dL (8.4-10.2)   11/12/17  10:59    


 


Troponin T  < 0.010 ng/mL (0.00-0.029)   11/12/17  10:59    


 


NT-Pro-B Natriuret Pep  87296 pg/mL (0-900)  H  11/12/17  10:59    


 


TSH  2.060 mlU/mL (0.270-4.200)   11/12/17  20:38    


 


Free T4  1.65 ng/dL (0.76-1.46)  H  11/12/17  20:38

## 2017-11-15 NOTE — PROGRESS NOTE
Assessment and Plan





Acute systolic heart failure


   EF 15-20% on recent echo


   Refusing coronary angiography


Hx of CAD with remote 3v CABG


Hypertension


Elevated D-Dimer


   Negative CTA chest for PE





Recommendations:





Continue medical therapy with BB, ACEi and diuretics


Discontinue milrinone infusion


Patient wants to set up appointment at [facility] for follow-up


May go home on lasix 40 mg po once a day











Subjective


Date of service: 11/15/17


Principal diagnosis: Heart failure


Interval history: 





Patient is refusing a cardiac cath this morning


She denies chest pain or shortness of breath





Objective


 Vital Signs











  Temp Pulse Pulse Resp BP Pulse Ox


 


 11/15/17 04:21  98.6 F  89   18  98/62  96


 


 11/15/17 03:37   82    


 


 11/15/17 00:52  98.5 F  82   18  101/53  97


 


 11/14/17 21:48   90    99/61 


 


 11/14/17 20:49  97.8 F  90   18  99/61  99


 


 11/14/17 18:18  97.9 F  99 H   20  147/78  98


 


 11/14/17 14:10   66    124/86 


 


 11/14/17 14:09   66    124/86 


 


 11/14/17 12:38  97.5 F L  92 H   18  124/86  100


 


 11/14/17 10:00    66  18  


 


 11/14/17 08:28  97.4 F L  85   18  129/82  100














- Physical Examination


HEENT: Positive: PERRL


Neck: Positive: trachea midline


Cardiac: Positive: Reg Rate and Rhythm


Lungs: Positive: Normal Exam


Neuro: Positive: Grossly Intact


Extremities: Absent: edema





- Labs and Meds


 Coagulation











  11/15/17 Range/Units





  05:15 


 


PT  13.7  (12.2-14.9)  Sec.


 


INR  1.00  (0.87-1.13)  








 Comprehensive Metabolic Panel











  11/15/17 Range/Units





  05:15 


 


Sodium  144  (137-145)  mmol/L


 


Potassium  3.8  (3.6-5.0)  mmol/L


 


Chloride  102.6  ()  mmol/L


 


Carbon Dioxide  26  (22-30)  mmol/L


 


BUN  22 H  (7-17)  mg/dL


 


Creatinine  1.0  (0.7-1.2)  mg/dL


 


Glucose  89  ()  mg/dL


 


Calcium  8.8  (8.4-10.2)  mg/dL

## 2017-11-15 NOTE — DISCHARGE SUMMARY
<ERIC LOPEZ - Last Filed: 11/15/17 20:16>





Providers





- Providers


Date of Admission: 


11/12/17 16:58





Date of discharge: 11/15/17


Attending physician: 


AMY J KOCHERLA





 





11/12/17 19:56


Consult to Physician [CONS] Routine 


   Consulting Provider: KODI WINSLOW


   Reason For Exam: chf


   Place consult to:: [facility]


   Notified:: y


   Comment:: added to list











Primary care physician: 


PRIMARY CARE MD








Hospitalization


Reason for admission: CHF


Condition: Stable


Hospital course: 


Patient is a 62 years old Female with HTN, MI, CAD S/P CABG, PUD, HLD, Vertigo, 

Systolic CHF(EF 15%) presents to ED for evaluation. Pt states that she has been 

experiencing shortness of breath for the past two days, with worsening symptoms 

over the past 1 day. Patient was diagnosed with Acute respiratory failure with 

hypoxia, Acute on chronic systolic Congestive heart failure, CAD (coronary 

artery disease) and  Uncontrolled hypertension. Chest xray showed CHF/pulmonary 

edema. Echocardiogram with severe ischemic cardiomyopathy, ejection fraction 15-

20%. Patient advised cardiac catheterization as soon as fluid overload is 

resolved but she refused cardiac cath at this time and she wants to set up 

appointment at [facility] for follow-up.  Patient presented with 

Shortness of breath due to CHF exacerbation.  She was treated with milrinone 

drip. Also IV diuresis, beta blockers and ACEI/ARB antilipid agents, 

antihypertensive medications. Patient clinically improved. Patient advised to 

follow-up with [facility] within 2 weeks of discharge. 





Discharge Diagnosed 





Acute respiratory failure with hypoxia


Acute on chronic systolic Congestive heart failure


CAD (coronary artery disease)


Uncontrolled hypertension


Placement





Disposition: DC-01 TO HOME OR SELFCARE


Time spent for discharge: 35 minutes





Core Measure Documentation





- Palliative Care


Palliative Care/ Comfort Measures: Not Applicable





- Core Measures


Any of the following diagnoses?: none





Exam





- Constitutional


Vitals: 


 











Temp Pulse Resp BP Pulse Ox


 


 98.5 F   79   18   100/57   100 


 


 11/15/17 12:00  11/15/17 12:00  11/15/17 12:00  11/15/17 12:00  11/15/17 14:11











General appearance: Present: no acute distress





- EENT


Eyes: Present: PERRL


ENT: hearing intact





- Neck


Neck: Present: supple





- Respiratory


Respiratory effort: normal


Respiratory: bilateral: CTA





- Cardiovascular


Rhythm: regular


Heart Sounds: Present: S1 & S2





- Abdominal


General gastrointestinal: Present: soft, non-tender


Female genitourinary: Present: deferred





- Rectal


Rectal Exam: deferred





- Integumentary


Integumentary: Present: clear, warm, dry





- Musculoskeletal


Musculoskeletal: strength equal bilaterally





- Psychiatric


Psychiatric: appropriate mood/affect





- Neurologic


Neurologic: moves all extremities





- Allied Health


Allied health notes reviewed: nursing





Plan


Diet: low fat, low cholesterol, low salt


Follow up with: 


[facility] [Provider Group] - 7 Days


PRIMARY CARE,MD [Primary Care Provider] - 3-5 Days


KODI WINSLOW MD [Staff Physician] - 7 Days


Prescriptions: 


Aspirin [Aspirin BABY CHEW TAB] 81 mg PO QDAY #30 tab.chew


AtorvaSTATin [Lipitor] 80 mg PO QHS #30 tablet


Carvedilol [Coreg] 3.125 mg PO BID 30 Days  tablet


Clopidogrel [Plavix] 75 mg PO QDAY #30 tablet


Furosemide [Lasix TAB] 40 mg PO QDAY #30 tablet


Lisinopril [Zestril TAB] 5 mg PO QDAY 30 Days  tablet





<KOCHERLA,AMY J - Last Filed: 11/16/17 18:16>





Providers





- Providers


Date of Admission: 


11/12/17 16:58





Attending physician: 


AMY J KOCHERLA





 





11/12/17 19:56


Consult to Physician [CONS] Routine 


   Consulting Provider: KODI WINSLOW


   Reason For Exam: chf


   Place consult to:: [facility]


   Notified:: y


   Comment:: added to list











Primary care physician: 


PRIMARY CARE MD








Exam





- Constitutional


Vitals: 


 











Temp Pulse Resp BP Pulse Ox


 


 98.5 F   79   18   100/57   100 


 


 11/15/17 12:00  11/15/17 12:00  11/15/17 12:00  11/15/17 12:00  11/15/17 14:11

## 2017-11-15 NOTE — PROGRESS NOTE
Assessment and Plan


Assessment and plan: 


Patient is a 63 YO Female with HTN, MI, CAD S/P CABG, PUD, HLD, Vertigo, 

Systolic CHF(EF 15%) presents to ED for evaluation. Pt states that she has been 

experiencing shortness of breath for the past two days, with worsening symptoms 

over the past 1 day.





Acute respiratory failure with hypoxia


Patient oxygen saturation improved with 2LNC; currently SPO2 98%. No acute 

respiratory distress noted. 


Aggressive Nebulizers/Inhalers


ABG when necessary


Oxygen supplement


Patient refused cardiac cath at this time and she wants to set up appointment 

at [facility] for follow-up


Disposition Cardiology started milrinone drip yesterday, we will D/C drip 

tomorrow which will be 48hrs. Patient will  be discharge tomorrow.





Acute on chronic systolic Congestive heart failure


Recent Echocardiogram with severe ischemic cardiomyopathy, ejection fraction 15-

20% 


IVdiuresis, beta blockers and ACEI/ARB 


Strict I&O's and daily weights


Low-sodium/cardiac diet/fluid restriction 


Closely monitor electrolytes


Patient will has cardiac catheterization as soon as fluid overload is resolved 

per cardiology


Patient refused cardiac cath at this time and she wants to set up appointment 

at [facility] for follow-up


Cardiology evaluation





CAD (coronary artery disease)


Continue Anti platelet therapy, statin therapy, 


Low cholesterol diet. 


Telemetry monitoring,





Uncontrolled hypertension


Continue on home antihypertensive medication


Closely monitor Blood pressure 





Placement


Patient reports that she is homeless 


Discuss with case management; for possible placement





DVT prophylaxis


Lovenox 


























History


Interval history: 





Patient denies chest pain,shortness of breath or dizziness. labs and nursing 

notes reviewed. 











Hospitalist Physical





- Constitutional


Vitals: 


 











Temp Pulse Resp BP Pulse Ox


 


 98.6 F   89   18   98/62   96 


 


 11/15/17 04:21  11/15/17 04:21  11/15/17 04:21  11/15/17 04:21  11/15/17 04:21











General appearance: Present: mild distress





- EENT


Eyes: Present: PERRL


ENT: hearing intact





- Neck


Neck: Present: supple





- Respiratory


Respiratory effort: normal


Respiratory: bilateral: CTA





- Cardiovascular


Rhythm: regular


Heart Sounds: Present: S1 & S2





- Abdominal


General gastrointestinal: soft, non-tender





- Integumentary


Integumentary: Present: clear, warm, dry





- Psychiatric


Psychiatric: appropriate mood/affect





- Neurologic


Neurologic: moves all extremities





- Allied Health


Allied health notes reviewed: nursing





Results





- Labs


CBC & Chem 7: 


 11/12/17 10:59





 11/15/17 05:15


Labs: 


 Laboratory Last Values











WBC  8.9 K/mm3 (4.5-11.0)   11/12/17  10:59    


 


RBC  5.21 M/mm3 (3.65-5.03)  H  11/12/17  10:59    


 


Hgb  15.0 gm/dl (10.1-14.3)  H  11/12/17  10:59    


 


Hct  47.1 % (30.3-42.9)  H  11/12/17  10:59    


 


MCV  90 fl (79-97)   11/12/17  10:59    


 


MCH  29 pg (28-32)   11/12/17  10:59    


 


MCHC  32 % (30-34)   11/12/17  10:59    


 


RDW  16.1 % (13.2-15.2)  H  11/12/17  10:59    


 


Plt Count  273 K/mm3 (140-440)   11/12/17  10:59    


 


Lymph % (Auto)  23.6 % (13.4-35.0)   11/12/17  10:59    


 


Mono % (Auto)  9.3 % (0.0-7.3)  H  11/12/17  10:59    


 


Eos % (Auto)  0.4 % (0.0-4.3)   11/12/17  10:59    


 


Baso % (Auto)  0.8 % (0.0-1.8)   11/12/17  10:59    


 


Lymph #  2.1 K/mm3 (1.2-5.4)   11/12/17  10:59    


 


Mono #  0.8 K/mm3 (0.0-0.8)   11/12/17  10:59    


 


Eos #  0.0 K/mm3 (0.0-0.4)   11/12/17  10:59    


 


Baso #  0.1 K/mm3 (0.0-0.1)   11/12/17  10:59    


 


Seg Neutrophils %  65.9 % (40.0-70.0)   11/12/17  10:59    


 


Seg Neutrophils #  5.9 K/mm3 (1.8-7.7)   11/12/17  10:59    


 


PT  13.7 Sec. (12.2-14.9)   11/15/17  05:15    


 


INR  1.00  (0.87-1.13)   11/15/17  05:15    


 


D-Dimer  1606.88 ng/mlDDU (0-234)  H  11/12/17  11:59    


 


POC ABG pH  7.441  (7.35-7.45)   11/12/17  20:41    


 


POC ABG pCO2  30.1  (35-45)  L  11/12/17  20:41    


 


POC ABG pO2  143  ()  H  11/12/17  20:41    


 


POC ABG HCO3  20.5   11/12/17  20:41    


 


POC ABG Total CO2  21   11/12/17  20:41    


 


POC ABG O2 Sat  99   11/12/17  20:41    


 


POC ABG Base Excess  -4   11/12/17  20:41    


 


Carboxyhemoglobin  4.2   11/12/17  12:27    


 


FiO2  3 %  11/12/17  20:41    


 


Sodium  144 mmol/L (137-145)   11/15/17  05:15    


 


Potassium  3.8 mmol/L (3.6-5.0)   11/15/17  05:15    


 


Chloride  102.6 mmol/L ()   11/15/17  05:15    


 


Carbon Dioxide  26 mmol/L (22-30)   11/15/17  05:15    


 


Anion Gap  19 mmol/L  11/15/17  05:15    


 


BUN  22 mg/dL (7-17)  H  11/15/17  05:15    


 


Creatinine  1.0 mg/dL (0.7-1.2)   11/15/17  05:15    


 


Estimated GFR  > 60 ml/min  11/15/17  05:15    


 


BUN/Creatinine Ratio  22 %  11/15/17  05:15    


 


Glucose  89 mg/dL ()   11/15/17  05:15    


 


POC Glucose  118  ()  H  11/14/17  17:05    


 


Calcium  8.8 mg/dL (8.4-10.2)   11/15/17  05:15    


 


Troponin T  < 0.010 ng/mL (0.00-0.029)   11/12/17  10:59    


 


NT-Pro-B Natriuret Pep  87843 pg/mL (0-900)  H  11/12/17  10:59    


 


TSH  2.060 mlU/mL (0.270-4.200)   11/12/17  20:38    


 


Free T4  1.65 ng/dL (0.76-1.46)  H  11/12/17  20:38

## 2017-11-24 NOTE — EMERGENCY DEPARTMENT REPORT
ED Shortness of Breath HPI





- General


Chief Complaint: Dyspnea/Respdistress


Stated Complaint: SHORT OF BREATH


Time Seen by Provider: 17 03:37


Source: patient


Mode of arrival: Ambulatory


Limitations: No Limitations





- History of Present Illness


Initial Comments: 


Patient is 62-year-old female with past medical history of CAD triple bypass 

and hypertension presents to ER with shortness of breath for 6 hours.  Patient 

states she was normal prior to that. Having some issues in her house with mold.

  Denies chest pain and fever. 





MD Complaint: shortness of breath


-: Sudden, hour(s)


Severity: severe


Consistency: constant


Improves With: rest


Worsens With: exertion


Context: allergen exposure


Associated Symptoms: cough


Treatments Prior to Arrival: none





- Related Data


Home Oxygen Therapy: No


 Previous Rx's











 Medication  Instructions  Recorded  Last Taken  Type


 


ALBUTEROL Inhaler [ProAir HFA 2 puff IH QID PRN #30 inha 17 1 Day Ago Rx





Inhaler]   ~17 


 


Cilostazol [Pletal] 50 mg PO BID #60 tablet 17 1 Day Ago Rx





   ~17 


 


Gabapentin [Neurontin] 300 mg PO BID #60 cap 17 1 Day Ago Rx





   ~17 


 


Aspirin [Aspirin BABY CHEW TAB] 81 mg PO QDAY #30 tab.chew 11/15/17 Unknown Rx


 


AtorvaSTATin [Lipitor] 80 mg PO QHS #30 tablet 11/15/17 Unknown Rx


 


Carvedilol [Coreg] 3.125 mg PO BID 30 Days  tablet 11/15/17 Unknown Rx


 


Clopidogrel [Plavix] 75 mg PO QDAY #30 tablet 11/15/17 Unknown Rx


 


Furosemide [Lasix TAB] 40 mg PO QDAY #30 tablet 11/15/17 Unknown Rx


 


Lisinopril [Zestril TAB] 5 mg PO QDAY 30 Days  tablet 11/15/17 Unknown Rx











 Allergies











Allergy/AdvReac Type Severity Reaction Status Date / Time


 


No Known Allergies Allergy   Verified 09/03/15 03:02














ED Review of Systems


ROS: 


Stated complaint: SHORT OF BREATH


Other details as noted in HPI





Comment: All other systems reviewed and negative


Constitutional: no symptoms reported, see HPI


Eyes: as per HPI


ENT: as per HPI


Respiratory: see HPI, cough, shortness of breath


Cardiovascular: as per HPI


Endocrine: no symptoms reported


Gastrointestinal: as per HPI


Genitourinary: as per HPI


Musculoskeletal: as per HPI


Skin: as per HPI


Neurological: as per HPI


Psychiatric: as per HPI


Hematological/Lymphatic: as per HPI





ED Past Medical Hx





- Past Medical History


Previous Medical History?: Yes


Hx Hypertension: Yes


Hx Heart Attack/AMI: Yes


Hx Congestive Heart Failure: No


Hx Diabetes: No


Hx Asthma: No


Hx COPD: No





- Surgical History


Past Surgical History?: Yes


Hx Open Heart Surgery: Yes


Additional Surgical History: triple bypass, 





- Social History


Smoking Status: Never Smoker


Substance Use Type: None





- Medications


Home Medications: 


 Home Medications











 Medication  Instructions  Recorded  Confirmed  Last Taken  Type


 


ALBUTEROL Inhaler [ProAir HFA 2 puff IH QID PRN #30 inha 17 1 

Day Ago Rx





Inhaler]    ~17 


 


Cilostazol [Pletal] 50 mg PO BID #60 tablet 17 1 Day Ago Rx





    ~17 


 


Gabapentin [Neurontin] 300 mg PO BID #60 cap 17 1 Day Ago Rx





    ~17 


 


Aspirin [Aspirin BABY CHEW TAB] 81 mg PO QDAY #30 tab.chew 11/15/17  Unknown Rx


 


AtorvaSTATin [Lipitor] 80 mg PO QHS #30 tablet 11/15/17  Unknown Rx


 


Carvedilol [Coreg] 3.125 mg PO BID 30 Days  tablet 11/15/17  Unknown Rx


 


Clopidogrel [Plavix] 75 mg PO QDAY #30 tablet 11/15/17  Unknown Rx


 


Furosemide [Lasix TAB] 40 mg PO QDAY #30 tablet 11/15/17  Unknown Rx


 


Lisinopril [Zestril TAB] 5 mg PO QDAY 30 Days  tablet 11/15/17  Unknown Rx














ED Physical Exam





- General


Limitations: No Limitations


General appearance: alert, in no apparent distress





- Head


Head exam: Present: atraumatic, normocephalic





- Eye


Eye exam: Present: normal appearance





- ENT


ENT exam: Present: mucous membranes moist





- Neck


Neck exam: Present: normal inspection





- Respiratory


Respiratory exam: Present: normal lung sounds bilaterally.  Absent: respiratory 

distress





- Cardiovascular


Cardiovascular Exam: Present: regular rate, normal rhythm.  Absent: systolic 

murmur, diastolic murmur, rubs, gallop





- GI/Abdominal


GI/Abdominal exam: Present: soft, normal bowel sounds





- Extremities Exam


Extremities exam: Present: normal inspection





- Back Exam


Back exam: Present: normal inspection





- Neurological Exam


Neurological exam: Present: alert, oriented X3





- Psychiatric


Psychiatric exam: Present: normal affect, normal mood





- Skin


Skin exam: Present: warm, dry, intact, normal color.  Absent: rash





ED Course


 Vital Signs











  17





  01:23 01:40 03:25


 


Temperature 97.3 F L 97.3 F L 97.6 F


 


Pulse Rate 101 H 100 H 97 H


 


Respiratory 18 18 20





Rate   


 


Blood Pressure 148/106  


 


Blood Pressure  148/108 135/103





[Right]   


 


O2 Sat by Pulse 98 98 97





Oximetry   














ED Medical Decision Making





- Lab Data


Result diagrams: 


 17 01:57





 17 01:57





- EKG Data


-: EKG Interpreted by Me


EKG shows normal: sinus rhythm


Rate: normal





- EKG Data


When compared to previous EKG there are: no significant change, changes noted


Interpretation: no acute changes, normal EKG





- Radiology Data


Radiology results: image reviewed


No acute findings








- Medical Decision Making


62-year-old female presented to the ER for shortness of breath with past 

medical history of CAD and triple bypass and hypertension.  Consult hospitalist 

for admission.  Hospitalist agreed to admit. 








- Differential Diagnosis


acs, sob. 


Critical care attestation.: 


If time is entered above; I have spent that time in minutes in the direct care 

of this critically ill patient, excluding procedure time.








ED Disposition


Clinical Impression: 


 SOB (shortness of breath), CAD (coronary artery disease)





Disposition:  OP ADMIT IP TO THIS HOSP


Is pt being admited?: Yes


Does the pt Need Aspirin: No


Condition: Serious

## 2017-11-24 NOTE — EVENT NOTE
Date: 11/24/17


Patient was seen and examined.  Patient admitted past midnight today.  Patient 

states the oxygen and nebulizer treatment are really helping and she wants 

oxygen at home.  Patient thinks recent mold instillation is causing her 

breathing problems








I believe her main issue is acute exacerbation of COPD maybe related to 

allergic pneumonitis/mold no heart failure though, cxr wasn't read as overload, 

no proBNP was measured

## 2017-11-24 NOTE — HISTORY AND PHYSICAL REPORT
History of Present Illness


Date of examination: 17


History of present illness: 


62 -year-old woman with history of hypertension, hyperlipidemia CAD, heart 

failure comes to  the emergency room with complaints of shortness of breath 2 

dayswhich has worsened.  Patient stated that she recently developed mold in her 

house and since then her problems of shortness of breath started


Review Of  Systems:


Constitutional: no weight loss


Ears, eyes, nose, mouth and throat: no nasal congestion, no nasal discharge, no 

sinus pressure, blurry vision, diplopia


Neck: No neck pain or rigidity.


Cardiovascular: chest pain, orthopnea, palpitations


Respiratory: No  cough


Gastrointestinal: no abdominal pain, hematochezia


Genitourinary : no dysuria, frequency , hematuria


Musculoskeletal: no muscle ache 


Integumentary: no rash, no pruritis


Neurological: no parathesias, focal weakness


Endocrine: no cold or heat intolerance, no polyuria or polydipsia


Hematologic/Lymphatic: no easy bruising, no easy bleeding, no gland swelling


Allergic/Immunologic: no urticaria, no angioedema.





PAST MEDICAL HISTORY:CAD, heart failure, hypertension





PAST SURGICAL HISTORY:CABG, 





FAMILY HISTORY:diabetes, hypertension





SOCIAL HISTORY: Denies alcohol, tobacco, drugs




















Medications and Allergies


 Allergies











Allergy/AdvReac Type Severity Reaction Status Date / Time


 


No Known Allergies Allergy   Verified 09/03/15 03:02











 Home Medications











 Medication  Instructions  Recorded  Confirmed  Last Taken  Type


 


Metoprolol Xl [Metoprolol 25 mg PO QDAY 17 Unknown History





SUCCINATE ER TAB]     


 


Omeprazole 20 mg PO DAILY 17 Unknown History


 


Rosuvastatin Calcium 20 mg PO DAILY 17 Unknown History


 


Umeclidinium Brm/Vilanterol Tr 62.5 mcg PO DAILY 17 Unknown 

History





[Anoro Ellipta 62.5-25 Mcg INH]     











Active Meds: 


Active Medications





Acetaminophen (Tylenol)  650 mg PO Q4H PRN


   PRN Reason: Pain MILD(1-3)/Fever >100.5/HA


Bisacodyl (Dulcolax)  10 mg MT QDAY PRN


   PRN Reason: Constipation unrelieved by MOM


Enoxaparin Sodium (Lovenox)  30 mg SUB-Q QDAY JEROME


Magnesium Hydroxide (Milk Of Magnesia)  30 ml PO Q4H PRN


   PRN Reason: Constipation


Methylprednisolone Sodium Succinate (Solu-Medrol)  60 mg IV Q8H JEROME


   Last Admin: 17 06:10 Dose:  60 mg


Ondansetron HCl (Zofran)  4 mg IV Q8H PRN


   PRN Reason: N/V unrelieved by Reglan


Oxycodone/Acetaminophen (Percocet 5/325)  1 tab PO Q6H PRN


   PRN Reason: Pain, Moderate (4-6)











Exam





- Constitutional


Vitals: 


 











Temp Pulse Resp BP Pulse Ox


 


 97.6 F   95 H  24   146/95   98 


 


 17 03:25  17 04:30  17 04:30  17 04:30  17 04:30














Results





- Labs


CBC & Chem 7: 


 17 01:57





 17 01:57


Labs: 


 Abnormal lab results











  17 Range/Units





  01:57 01:57 


 


RBC   5.18 H  (3.65-5.03)  M/mm3


 


Hgb   15.0 H  (10.1-14.3)  gm/dl


 


Hct   46.2 H  (30.3-42.9)  %


 


RDW   15.7 H  (13.2-15.2)  %


 


Mono % (Auto)   11.2 H  (0.0-7.3)  %


 


Mono #   0.9 H  (0.0-0.8)  K/mm3


 


BUN  23 H   (7-17)  mg/dL


 


Glucose  105 H   ()  mg/dL














Assessment and Plan


Assessment


Allergic pneumonitis


hypertension


hyperlipidemia 


CAD


heart failure





Plan


Admit to medicine


Start steroids, nebulizer treatments


Continue appropriate outpatient medications


DVT prophylaxis

## 2017-11-24 NOTE — XRAY REPORT
FINAL REPORT



PROCEDURE:  XR CHEST ROUTINE 2V



TECHNIQUE:  PA and lateral chest radiographs were obtained. CPT

93012







HISTORY:  Shortness of breath 



COMPARISON:  No prior studies are available for comparison.



FINDINGS:  

Heart: The heart is borderline enlarged..



Mediastinum/Vessels: Normal.



Lungs/Pleural space: Lungs are expanded. There are mild fibrotic

changes but no active infiltrates. There are no effusions or

pneumothoraces..



Bony thorax: No acute osseous abnormality.



Other: There has been open heart surgery.



IMPRESSION:  

There is mild cardiomegaly. There is no acute lung disease..

## 2017-11-25 NOTE — DISCHARGE SUMMARY
Providers





- Providers


Date of Admission: 


11/24/17 05:48





Date of discharge: 11/25/17


Attending physician: 


JAMIE KATZ





Primary care physician: 


PRIMARY CARE MD








Hospitalization


Condition: Stable


Hospital course: 


Patient 62-year-old woman with history of coronary artery disease, MI status 

post CABG, ischemic cardiomyopathy, hypertension, CHF, dyslipidemia, peripheral 

vascular disease and stroke who presented with shortness of breath after 

suspected exposure to mold in her home, chest x-ray reported as mild 

cardiomegaly there is no acute lung disease. Patient o2 weaned off, she is 

asking to go home. She has left AMA from here in the past. 





-Bronchospasm/reactive airway disorder with suspected Allergic pneumoniits: 

discussed the treat is avoidance of allergen 


-HTN: stable


-Chronic systolic heart failure, not acute








Disposition: DC-01 TO HOME OR SELFCARE


Time spent for discharge: 35 minutes





Core Measure Documentation





- Palliative Care


Palliative Care/ Comfort Measures: Not Applicable





- Core Measures


Any of the following diagnoses?: none





- VTE Discharge Requirements


Deep Vein Thrombosis/Pulmonary Embolism Present on Admission: No


Has pt received <5 days of overlap therapy or INR<2.0: No


Anticoagulant overlap therapy prescribed at discharge: No


Contraindication No Overlap Therapy order at DC: Not Indicated





Exam





- Physical Exam


Narrative exam: 


GEN: thin frail, NAD, AWAKE, ALERT, ORIENTATED x 3


HEENT: NCAT, EOMI, PERRL, OP Clear


NECK: supple, no adenopathy, no thyromegaly, no JVD


CVS/HEART: RRR, NORMAL S1S2, NO JVD, pulses present bilaterally


CHEST/LUNGS: CTA B, Symmetrical chest expansion, good air entry bilaterally


GI/Abdomen: soft, NTND, good bowel sounds, no guarding or rebound


/Bladder: no suprapubic tenderness, no CVA or paraspinal tenderness


EXT/Skin: no c/c/e, no obvious rash


MSK: FROM x 4


Neuro: CN 2-12 grossly intact, no new focal deficits


Psych: calm








- Constitutional


Vitals: 


 











Temp Pulse Resp BP Pulse Ox


 


 98.8 F   92 H  18   142/104   99 


 


 11/25/17 08:36  11/25/17 08:36  11/25/17 08:36  11/25/17 08:36  11/25/17 08:36














Plan


Activity: other (no strenous activity until cleared by pcp)


Diet: low salt


Additional Instructions: See Dr. Fairchild to evaluate for COPD.  See Immunologist 

for allergy testing, Dr. Gama Dominguez [phone] or speak with your pcp


Follow up with: 


PRIMARY CARE,MD [Primary Care Provider] - 3-5 Days


MICA FAIRCHILD MD [Staff Physician] - 7 Days


Prescriptions: 


Famotidine [Pepcid] 20 mg PO BID #5 day


Loratadine [Claritin] 10 mg PO DAILY #30 tablet


methylPREDNISolone [Medrol Dose Cj] 1 dose PO DAILY #1 pack

## 2017-11-29 NOTE — XRAY REPORT
FINAL REPORT



EXAM:  XR CHEST ROUTINE 2V



HISTORY:  Shortness of breath 



TECHNIQUE:  PA and lateral chest radiographs 



PRIORS:  11/24/2017



FINDINGS:  

No mediastinal shift. Cardiac silhouette is mildly enlarged.

Overlying sternotomy wires. No pneumothorax, effusion, or focal

pulmonary opacity. No acute skeletal finding. 



IMPRESSION:  

No acute pulmonary finding.

## 2017-12-04 NOTE — CAT SCAN REPORT
CT ABDOMEN PELVIS WITH CONTRAST:



HISTORY:  abdominal pain.



COMPARISON: none.



TECHNIQUE:  Helical CT in 1.25mm intervals following IV contrast. 

Sagittal and coronal reconstructions. 





FINDINGS:



Lung bases: Mild cardiomegaly, trace right pleural effusion and small 

left pleural effusion. The visualized lung bases are well aerated.



Liver: normal.



Biliary system: normal.



Pancreas: normal.



Spleen: normal.



Kidneys/ureters/bladder: There is mild cortical thinning in both 

kidneys. No focal renal lesion or hydronephrosis. The ureters and 

bladder are unremarkable.



Adrenal glands: normal.



Aorta: There are moderate atherosclerotic plaques but no aneurysm.



Intestines: Limited given no oral contrast was administered. There are 

scattered diverticula in the distal colon. There appears to be a focus 

of inflammation in the mid descending colon suspicious for acute 

diverticulitis. No abscess or free air.



Appendix: normal.



Pelvic viscera: Few small calcified uterine fibroids are identified. No 

adnexal mass or large cyst.



Musculoskeletal: Mild osteoarthritic changes. No fracture or suspicious 

bony lesion.





IMPRESSION:

Acute descending colon diverticulitis.

Mild CHF.

Mild uterine fibroid disease.

Mild chronic renal parenchymal disease.

## 2017-12-04 NOTE — CAT SCAN REPORT
CTA CHEST:



HISTORY: Dyspnea.



COMPARISON: none.



TECHNIQUE: Helical CT in 1.25mm intervals following IV contrast.  

Pulmonary embolus protocol.  Sagittal and coronal reformatted images.  

Rotational MIP images.



FINDINGS:





Contrast bolus is satisfactory.  No pulmonary embolus is identified.



Thyroid gland: Normal.



Tracheobronchial tree: Normal.



Esophagus: Normal.



Heart: Mild cardiomegaly. No pericardial effusion.



Pericardium: Normal.



Mediastinum: Normal.



Lung Fields: normal.



Pleural Spaces: Trace right pleural effusion. Small layering left 

pleural effusion.



Musculoskeletal: Normal.





IMPRESSION: 

No evidence for pulmonary embolus.  

Mild CHF.

## 2017-12-04 NOTE — EMERGENCY DEPARTMENT REPORT
ED General Adult HPI





- General


Chief complaint: Dyspnea/Respdistress


Stated complaint: SHORT BREATH


Time Seen by Provider: 17 09:05


Source: patient, RN notes reviewed, old records reviewed


Mode of arrival: Ambulatory


Limitations: No Limitations





- History of Present Illness


Initial comments: 





This is a 62-year-old male who is previously unknown to this provider.  Past 

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 

congestive heart failure.  Patient admitted to the hospital 2017 for 

decompensated congestive heart failure, was seen in consultation with 

cardiology who recommended a catheterization, which the patient subsequently 

refused.  She indicated that she would follow up at another hospital and has 

not done so.  The patient presents to the ER today with a complaint of 

shortness of breath.  It started last night.  It is constant.  It worsens with 

physical exertion.  It decreases with rest.  Patient also endorses epigastric 

pain and abdominal pain.  This has been going on for 3 months.  It increases 

with palpation and decreases when she eats.  It does not radiate anywhere.  

Patient denies dietary indiscretions, patient is not sure if she is taking any 

water pill.


-: Gradual


Location: abdomen


Radiation: non-radiation


Severity scale (0 -10): 6


Quality: aching


Consistency: intermittent


Improves with: rest


Worsens with: movement


Associated Symptoms: shortness of breath.  denies: chest pain





- Related Data


 Home Medications











 Medication  Instructions  Recorded  Confirmed  Last Taken


 


Metoprolol Xl [Metoprolol 25 mg PO QDAY 17 Unknown





SUCCINATE ER TAB]    


 


Omeprazole 20 mg PO DAILY 17 Unknown








 Previous Rx's











 Medication  Instructions  Recorded  Last Taken  Type


 


Acetaminophen [Tylenol Arthritis] 650 mg PO Q6HR PRN #20 tablet.er 17 

Unknown Rx


 


Ciprofloxacin HCl [Cipro] 500 mg PO BID #10 tablet 17 Unknown Rx


 


Furosemide [Lasix] 20 mg PO QDAY #30 tablet 17 Unknown Rx


 


Metoclopramide [Reglan] 10 mg PO QID PRN #20 tab 17 Unknown Rx


 


metroNIDAZOLE [Flagyl] 500 mg PO Q8HR #15 tablet 17 Unknown Rx


 


hydrOXYzine HCL [Atarax] 25 mg PO Q6HR PRN #10 tablet 17 Unknown Rx











 Allergies











Allergy/AdvReac Type Severity Reaction Status Date / Time


 


No Known Allergies Allergy   Verified 09/03/15 03:02














ED Review of Systems


ROS: 


Stated complaint: SHORT BREATH


Other details as noted in HPI





Constitutional: denies: diaphoresis, fever, malaise


Eyes: denies: eye discharge


ENT: congestion


Respiratory: cough, shortness of breath


Cardiovascular: denies: chest pain


Gastrointestinal: denies: vomiting


Genitourinary: as per HPI


Musculoskeletal: as per HPI


Skin: as per HPI


Neurological: as per HPI


Psychiatric: as per HPI





ED Past Medical Hx





- Past Medical History


Previous Medical History?: Yes


Hx Hypertension: Yes


Hx Heart Attack/AMI: Yes


Hx Congestive Heart Failure: No


Hx Diabetes: No


Hx Asthma: No


Hx COPD: No





- Surgical History


Past Surgical History?: Yes


Hx Open Heart Surgery: Yes


Additional Surgical History: triple bypass, 





- Social History


Smoking Status: Never Smoker


Substance Use Type: None





- Medications


Home Medications: 


 Home Medications











 Medication  Instructions  Recorded  Confirmed  Last Taken  Type


 


Metoprolol Xl [Metoprolol 25 mg PO QDAY 17 Unknown History





SUCCINATE ER TAB]     


 


Omeprazole 20 mg PO DAILY 17 Unknown History


 


Acetaminophen [Tylenol Arthritis] 650 mg PO Q6HR PRN #20 tablet.er 17  

Unknown Rx


 


Ciprofloxacin HCl [Cipro] 500 mg PO BID #10 tablet 17  Unknown Rx


 


Furosemide [Lasix] 20 mg PO QDAY #30 tablet 17  Unknown Rx


 


Metoclopramide [Reglan] 10 mg PO QID PRN #20 tab 17  Unknown Rx


 


metroNIDAZOLE [Flagyl] 500 mg PO Q8HR #15 tablet 17  Unknown Rx


 


hydrOXYzine HCL [Atarax] 25 mg PO Q6HR PRN #10 tablet 17  Unknown Rx














ED Physical Exam





- General


Limitations: No Limitations


General appearance: alert, in no apparent distress





- Head


Head exam: Present: atraumatic, normocephalic





- Eye


Eye exam: Present: normal appearance, EOMI.  Absent: nystagmus





- ENT


ENT exam: Present: normal exam, normal orophraynx, mucous membranes moist, 

normal external ear exam





- Neck


Neck exam: Present: normal inspection, full ROM.  Absent: tenderness, 

meningismus





- Respiratory


Respiratory exam: Present: normal lung sounds bilaterally.  Absent: respiratory 

distress





- Cardiovascular


Cardiovascular Exam: Present: normal rhythm, tachycardia, normal heart sounds.  

Absent: systolic murmur, diastolic murmur, rubs, gallop





- GI/Abdominal


GI/Abdominal exam: Present: soft, tenderness, normal bowel sounds, other (there 

is epigastric tenderness.  There is midabdominal tenderness.).  Absent: 

distended, guarding, rebound, rigid, pulsatile mass





- Extremities Exam


Extremities exam: Present: normal inspection, full ROM, normal capillary refill

, pedal edema (1+ pitting edema in the lower extremities).  Absent: calf 

tenderness





- Back Exam


Back exam: Present: normal inspection, full ROM.  Absent: paraspinal tenderness

, vertebral tenderness





- Neurological Exam


Neurological exam: Present: alert, oriented X3, normal gait, other (Extraocular 

movements intact.  Tongue midline.  No facial droop.  Facial sensation intact 

to light touch in the V1, V2, V3 distribution bilaterally.  5 and 5 strength in 

4 extremities..  Sensation is intact to light touch in 4 extremities.).  Absent

: motor sensory deficit





- Psychiatric


Psychiatric exam: Present: normal affect, normal mood





- Skin


Skin exam: Present: warm, dry, intact, normal color.  Absent: rash





ED Course


 Vital Signs











  17





  01:08 01:13 04:58


 


Temperature 97.6 F 97.6 F 


 


Pulse Rate 105 H 101 H 83


 


Respiratory 18 18 19





Rate   


 


Blood Pressure 133/94  


 


Blood Pressure  133/94 





[Left]   


 


O2 Sat by Pulse 100 100 





Oximetry   














  17





  05:00 05:31 06:01


 


Temperature   


 


Pulse Rate 89 94 H 96 H


 


Respiratory 18 21 16





Rate   


 


Blood Pressure  145/82 125/84


 


Blood Pressure   





[Left]   


 


O2 Sat by Pulse   





Oximetry   














  17





  06:31 07:00 08:00


 


Temperature   97.5 F L


 


Pulse Rate 107 H 100 H 88


 


Respiratory 26 H 14 18





Rate   


 


Blood Pressure 136/92 128/93 


 


Blood Pressure   122/93





[Left]   


 


O2 Sat by Pulse   100





Oximetry   














  17





  11:38


 


Temperature 


 


Pulse Rate 86


 


Respiratory 16





Rate 


 


Blood Pressure 


 


Blood Pressure 141/97





[Left] 


 


O2 Sat by Pulse 97





Oximetry 














- Reevaluation(s)


Reevaluation #1: 





17 09:23


Differential diagnosis, including but not limited to: Hiatal hernia, GERD, 

gastritis, pancreatitis, IVC thrombosis, pulmonary embolus, congestive heart 

failure, pneumonitis, pneumonia, asthma





Assessment and plan: 62-year-old female with a primary complaint of shortness 

of breath.  She is afebrile with reassuring vital signs, her tachycardia has 

resolved, and when I walk into the room the patient is sleeping comfortably.  

She's been having epigastric pain for 3 months, she is somewhat tender, so CT 

scan of the abdomen and pelvis is ordered.  From a cardiac/congestive heart 

failure perspective, the patient is not hypoxic, she does not have crackles, 

she does not have rales, he does not appear to have acute decompensated 

congestive heart failure.  I appreciate a cardiology recently recommended a 

cardiac catheterization, this was almost 3 weeks ago, the patient has failed to 

follow-up.  I will discuss with cardiology.





CT scan of the chest is ordered as well, although I doubt pulmonary embolus, 

the patient had an essentially negative CT scan of the chest a few weeks ago.


Reevaluation #2: 





17 11:11


CT scan of the abdomen and pelvis demonstrates minimal pleural effusions, 

descending colonic diverticulitis.  CT scan of the chest is pending.





Case presented to cardiology on-call, CORIE pederson.





Patient is given a follow-up appointment tomorrow, , at 1:40 PM, with 

Dr. Blanco, at the local office








Given that patient has been given closely arranged outpatient follow-up, given 

that she does not appear to be acutely decompensated congestive heart failure 

standpoint, patient will be discharged.  She will be given a short course of 

antibiotic therapy for her presumed diverticulitis, and instructed to follow up 

with outpatient gastroenterology.


Reevaluation #3: 





17 11:24


CT scan of the chest was negative for pneumonia, large pleural effusion, or 

pulmonary embolus.  Patient saturating well and resting currently.  Repeat 

pulmonary exam is unremarkable.  She is not wheezing she does not have rales or 

crackles.  She is medically suitable follow-up with outpatient cardiology.





ED Medical Decision Making





- Lab Data


Result diagrams: 


 17 01:25





 17 01:25








 Vital Signs











  17





  01:08 01:13 04:58


 


Temperature 97.6 F 97.6 F 


 


Pulse Rate 105 H 101 H 83


 


Respiratory 18 18 19





Rate   


 


Blood Pressure 133/94  


 


Blood Pressure  133/94 





[Left]   


 


O2 Sat by Pulse 100 100 





Oximetry   














  17





  05:00 05:31 06:01


 


Temperature   


 


Pulse Rate 89 94 H 96 H


 


Respiratory 18 21 16





Rate   


 


Blood Pressure  145/82 125/84


 


Blood Pressure   





[Left]   


 


O2 Sat by Pulse   





Oximetry   














  17





  06:31 07:00 08:00


 


Temperature   97.5 F L


 


Pulse Rate 107 H 100 H 88


 


Respiratory 26 H 14 18





Rate   


 


Blood Pressure 136/92 128/93 


 


Blood Pressure   122/93





[Left]   


 


O2 Sat by Pulse   100





Oximetry   














 Lab Results











  17 Range/Units





  01:25 01:25 


 


WBC   10.6  (4.5-11.0)  K/mm3


 


RBC   5.49 H  (3.65-5.03)  M/mm3


 


Hgb   15.9 H  (10.1-14.3)  gm/dl


 


Hct   47.8 H  (30.3-42.9)  %


 


MCV   87  (79-97)  fl


 


MCH   29  (28-32)  pg


 


MCHC   33  (30-34)  %


 


RDW   15.7 H  (13.2-15.2)  %


 


Plt Count   222  (140-440)  K/mm3


 


Lymph % (Auto)   28.0  (13.4-35.0)  %


 


Mono % (Auto)   9.4 H  (0.0-7.3)  %


 


Eos % (Auto)   1.5  (0.0-4.3)  %


 


Baso % (Auto)   0.6  (0.0-1.8)  %


 


Lymph #   3.0  (1.2-5.4)  K/mm3


 


Mono #   1.0 H  (0.0-0.8)  K/mm3


 


Eos #   0.2  (0.0-0.4)  K/mm3


 


Baso #   0.1  (0.0-0.1)  K/mm3


 


Seg Neutrophils %   60.5  (40.0-70.0)  %


 


Seg Neutrophils #   6.4  (1.8-7.7)  K/mm3


 


Sodium  139   (137-145)  mmol/L


 


Potassium  3.9   (3.6-5.0)  mmol/L


 


Chloride  98.0   ()  mmol/L


 


Carbon Dioxide  26   (22-30)  mmol/L


 


Anion Gap  19   mmol/L


 


BUN  21 H   (7-17)  mg/dL


 


Creatinine  1.1   (0.7-1.2)  mg/dL


 


Estimated GFR  > 60   ml/min


 


BUN/Creatinine Ratio  19   %


 


Glucose  112 H   ()  mg/dL


 


Calcium  9.3   (8.4-10.2)  mg/dL


 


Troponin T  < 0.010   (0.00-0.029)  ng/mL














- EKG Data


-: EKG Interpreted by Me





- EKG Data





17 10:45


Normal sinus, 87 bpm, left ventricular hypertrophy, normal axis, premature 

ventricular contractions, QTC prolonged, abnormal EKG, not morphologically 

consistent with ST elevation infarction appears unchanged from prior.





- Radiology Data


Radiology results: report reviewed, image reviewed





X-ray of the chest demonstrates cardiomegaly, median sternotomy, small left-

sided pleural effusion.  Mild congestive heart failure, mild pulmonary vascular 

congestion


Critical care attestation.: 


If time is entered above; I have spent that time in minutes in the direct care 

of this critically ill patient, excluding procedure time.








ED Disposition


Clinical Impression: 


 Shortness of breath, Abdominal pain





Disposition: DC-01 TO HOME OR SELFCARE


Is pt being admited?: No


Does the pt Need Aspirin: No


Condition: Stable


Instructions:  Heart Failure (ED), Diverticulitis (ED)


Additional Instructions: 


Take the Lasix water pill as recommended/directed.  Follow up with cardiology 

tomorrow, , 1:40 PM, with Dr. Britton Blanco at the listed location.  CT 

scan of the abdomen and pelvis demonstrated inflammation of the descending 

colon.  Take antibiotic therapy as directed, take the pain medication as needed/

directed, take nausea medication as needed/directed.  Follow up with a 

gastroenterologist within the next month.  [facility] is a local 

gastroenterology practice.  Not following up as recommended with 

gastroenterology may resultant undiagnosed tumor, cancer, malignancy.  Avoid 

alcohol consumption for the next month.  Return to the ER right away with new 

pain, worsening pain, migration of pain, fevers, chills, lethargy, irritability

, projectile vomiting, confusion, change in mental status, inability to 

tolerate liquid feeds.











Prescriptions: 


Acetaminophen [Tylenol Arthritis] 650 mg PO Q6HR PRN #20 tablet.er


 PRN Reason: Pain


Ciprofloxacin HCl [Cipro] 500 mg PO BID #10 tablet


Furosemide [Lasix] 20 mg PO QDAY #30 tablet


Metoclopramide [Reglan] 10 mg PO QID PRN #20 tab


 PRN Reason: Nausea


metroNIDAZOLE [Flagyl] 500 mg PO Q8HR #15 tablet


Referrals: 


PRIMARY CARE,MD [Primary Care Provider] - 3-5 Days


KODI WINSLOW MD [Staff Physician] - 3-5 Days


BRITTON BLANCO MD [Staff Physician] - 3-5 Days


[facility] [Provider Group] - 3-5 Days

## 2017-12-04 NOTE — XRAY REPORT
ROUTINE CHEST, TWO VIEWS:



HISTORY:  Shortness of breath.



Compared to 11/29/17. Mild cardiomegaly, mild pulmonary venous 

congestion and trace left pleural effusion are unchanged. The lungs are 

clear. No pneumothorax is appreciated. CABG changes are noted. The bony 

structures are grossly intact.



IMPRESSION:

Mild CHF.

## 2017-12-05 NOTE — EMERGENCY DEPARTMENT REPORT
ED General Adult HPI





- General


Chief complaint: Dizziness


Stated complaint: MH


Time Seen by Provider: 17 11:37


Source: patient


Mode of arrival: Ambulatory


Limitations: No Limitations





- History of Present Illness


Initial comments: 


Patient presents to the emergency department with vague complaints.  Does 

appear that her chief complaint is a nonproductive cough and left earache.  She 

denies being on any mental health medications.  She has an odd manner of 

expressing herself but certainly no suicidality.  She is not depressed she is 

not homicidal she is not agitated and she is not hallucinating.  At the time of 

my evaluation she really is not complaining of any dizziness or any specific 

symptoms.  In fact she says "thank you Piter I hope I can go home now".  She is 

oriented and evaluated by mental health.  They concluded and I concur that she 

has no sensory 13 criteria.  I don't think she has any active mental health 

process process either.





-: Gradual, days(s)


Location: left


Radiation: non-radiation


Severity scale (0 -10): 2


Quality: other (stuffy and dizziness)


Consistency: other


Improves with: none


Worsens with: none


Associated Symptoms: denies other symptoms


Treatments Prior to Arrival: none





- Related Data


 Home Medications











 Medication  Instructions  Recorded  Confirmed  Last Taken


 


Metoprolol Xl [Metoprolol 25 mg PO QDAY 17 Unknown





SUCCINATE ER TAB]    


 


Omeprazole 20 mg PO DAILY 17 Unknown








 Previous Rx's











 Medication  Instructions  Recorded  Last Taken  Type


 


Acetaminophen [Tylenol Arthritis] 650 mg PO Q6HR PRN #20 tablet.er 17 

Unknown Rx


 


Ciprofloxacin HCl [Cipro] 500 mg PO BID #10 tablet 17 Unknown Rx


 


Furosemide [Lasix] 20 mg PO QDAY #30 tablet 17 Unknown Rx


 


Metoclopramide [Reglan] 10 mg PO QID PRN #20 tab 17 Unknown Rx


 


metroNIDAZOLE [Flagyl] 500 mg PO Q8HR #15 tablet 17 Unknown Rx


 


hydrOXYzine HCL [Atarax] 25 mg PO Q6HR PRN #10 tablet 17 Unknown Rx











 Allergies











Allergy/AdvReac Type Severity Reaction Status Date / Time


 


No Known Allergies Allergy   Verified 09/03/15 03:02














ED Review of Systems


ROS: 


Stated complaint: MH


Other details as noted in HPI





Constitutional: denies: chills, fever


Eyes: denies: eye pain, eye discharge, vision change


ENT: as per HPI, ear pain.  denies: throat pain


Respiratory: denies: cough, shortness of breath, wheezing


Cardiovascular: denies: chest pain, palpitations


Endocrine: no symptoms reported


Gastrointestinal: denies: abdominal pain, nausea, diarrhea


Genitourinary: denies: urgency, dysuria, discharge


Musculoskeletal: denies: back pain, joint swelling, arthralgia


Skin: denies: rash, lesions


Neurological: denies: headache, weakness, numbness, paresthesias, confusion, 

abnormal gait


Psychiatric: denies: anxiety, depression


Hematological/Lymphatic: denies: easy bleeding, easy bruising





ED Past Medical Hx





- Past Medical History


Previous Medical History?: Yes


Hx Hypertension: Yes


Hx Heart Attack/AMI: Yes


Hx Congestive Heart Failure: No


Hx Diabetes: No


Hx Asthma: No


Hx COPD: No





- Surgical History


Past Surgical History?: Yes


Hx Open Heart Surgery: Yes


Additional Surgical History: triple bypass, 





- Social History


Smoking Status: Current Some Day Smoker





- Medications


Home Medications: 


 Home Medications











 Medication  Instructions  Recorded  Confirmed  Last Taken  Type


 


Metoprolol Xl [Metoprolol 25 mg PO QDAY 17 Unknown History





SUCCINATE ER TAB]     


 


Omeprazole 20 mg PO DAILY 17 Unknown History


 


Acetaminophen [Tylenol Arthritis] 650 mg PO Q6HR PRN #20 tablet.er 17  

Unknown Rx


 


Ciprofloxacin HCl [Cipro] 500 mg PO BID #10 tablet 17  Unknown Rx


 


Furosemide [Lasix] 20 mg PO QDAY #30 tablet 17  Unknown Rx


 


Metoclopramide [Reglan] 10 mg PO QID PRN #20 tab 17  Unknown Rx


 


metroNIDAZOLE [Flagyl] 500 mg PO Q8HR #15 tablet 17  Unknown Rx


 


hydrOXYzine HCL [Atarax] 25 mg PO Q6HR PRN #10 tablet 17  Unknown Rx














ED Physical Exam





- General


Limitations: No Limitations


General appearance: alert, in no apparent distress





- Head


Head exam: Present: atraumatic, normocephalic





- Eye


Eye exam: Present: normal appearance, PERRL, EOMI.  Absent: scleral icterus





- ENT


ENT exam: Present: mucous membranes moist.  Absent: TM's normal bilaterally (

perhaps slight amount of fluid evident left TM but no signs of infection 

apparent.  )





- Neck


Neck exam: Present: normal inspection.  Absent: tenderness, meningismus





- Respiratory


Respiratory exam: Present: normal lung sounds bilaterally.  Absent: respiratory 

distress





- Cardiovascular


Cardiovascular Exam: Present: regular rate, normal rhythm.  Absent: systolic 

murmur, diastolic murmur, rubs, gallop





- GI/Abdominal


GI/Abdominal exam: Present: soft, normal bowel sounds.  Absent: distended, 

tenderness, guarding, rebound





- Extremities Exam


Extremities exam: Present: normal inspection





- Back Exam


Back exam: Present: normal inspection





- Neurological Exam


Neurological exam: Present: alert, oriented X3, CN II-XII intact, normal gait.  

Absent: motor sensory deficit





- Psychiatric


Psychiatric exam: Present: normal affect, normal mood





- Skin


Skin exam: Present: warm, dry, intact, normal color.  Absent: rash





ED Course


 Vital Signs











  17





  21:31 03:12 08:02


 


Temperature 97.5 F L 97.5 F L 


 


Pulse Rate 92 H 95 H 92 H


 


Respiratory 18 18 18





Rate   


 


Blood Pressure 144/99 137/93 144/100


 


O2 Sat by Pulse 100 99 100





Oximetry   














  17





  08:42 09:03


 


Temperature 98.6 F 


 


Pulse Rate  


 


Respiratory  20





Rate  


 


Blood Pressure  


 


O2 Sat by Pulse  





Oximetry  














- Reevaluation(s)


Reevaluation #1: 


She was observed in the emergency department for some time.  She was not 

agitated.  She displayed no uncompensated mental health problems.  She was seen 

by the counselor who concluded that she does have an odd way of expressing 

herself she did not need any sort of mental health stabilization.  She will be 

given an antihistamine for perhaps serous otitis media and discharge to primary 

care follow-up.


17 14:03








ED Medical Decision Making





- Lab Data


Result diagrams: 


 17 21:49





 17 21:49








 Laboratory Results - last 24 hr











  17





  21:49 21:49 00:14


 


WBC  10.2  


 


RBC  5.27 H  


 


Hgb  14.9 H  


 


Hct  46.6 H  


 


MCV  88  


 


MCH  28  


 


MCHC  32  


 


RDW  15.9 H  


 


Plt Count  221  


 


Lymph % (Auto)  28.8  


 


Mono % (Auto)  11.1 H  


 


Eos % (Auto)  1.6  


 


Baso % (Auto)  0.7  


 


Lymph #  2.9  


 


Mono #  1.1 H  


 


Eos #  0.2  


 


Baso #  0.1  


 


Seg Neutrophils %  57.8  


 


Seg Neutrophils #  5.9  


 


Sodium   139 


 


Potassium   3.9 


 


Chloride   97.3 L 


 


Carbon Dioxide   25 


 


Anion Gap   21 


 


BUN   19 H 


 


Creatinine   1.2 


 


Estimated GFR   55 


 


BUN/Creatinine Ratio   16 


 


Glucose   123 H 


 


Calcium   9.1 


 


Troponin T   < 0.010  < 0.010














  17





  03:36


 


WBC 


 


RBC 


 


Hgb 


 


Hct 


 


MCV 


 


MCH 


 


MCHC 


 


RDW 


 


Plt Count 


 


Lymph % (Auto) 


 


Mono % (Auto) 


 


Eos % (Auto) 


 


Baso % (Auto) 


 


Lymph # 


 


Mono # 


 


Eos # 


 


Baso # 


 


Seg Neutrophils % 


 


Seg Neutrophils # 


 


Sodium 


 


Potassium 


 


Chloride 


 


Carbon Dioxide 


 


Anion Gap 


 


BUN 


 


Creatinine 


 


Estimated GFR 


 


BUN/Creatinine Ratio 


 


Glucose 


 


Calcium 


 


Troponin T  < 0.010














- EKG Data


-: EKG Interpreted by Me


EKG shows normal: sinus rhythm, axis, intervals, QRS complexes





- EKG Data


Interpretation: LVH (LVH and left atrial enlargement typical associated 

repolarization abnormality serial EKGs without change)


Critical care attestation.: 


If time is entered above; I have spent that time in minutes in the direct care 

of this critically ill patient, excluding procedure time.








ED Disposition


Clinical Impression: 


Serous otitis media


Qualifiers:


 Chronicity: unspecified Laterality: left Qualified Code(s): H65.92 - 

Unspecified nonsuppurative otitis media, left ear





URI (upper respiratory infection)


Qualifiers:


 URI type: unspecified viral URI Qualified Code(s): J06.9 - Acute upper 

respiratory infection, unspecified; B97.89 - Other viral agents as the cause of 

diseases classified elsewhere; B97.89 - Other viral agents as the cause of 

diseases classified elsewhere





Disposition: DC-01 TO HOME OR SELFCARE


Is pt being admited?: No


Does the pt Need Aspirin: No


Condition: Stable


Instructions:  Upper Respiratory Infection (ED)


Additional Instructions: 


Follow-up with a primary care provider or the [facility].  Atarax 

as needed for stuffiness or cold symptoms.


Prescriptions: 


hydrOXYzine HCL [Atarax] 25 mg PO Q6HR PRN #10 tablet


 PRN Reason: Itching


Referrals: 


PRIMARY CARE,MD [Primary Care Provider] - 3-5 Days


[facility] [Provider Group] - 3-5 Days


Time of Disposition: 14:10

## 2017-12-05 NOTE — XRAY REPORT
AP CHEST : 12/05/17







CLINICAL: Hypertension.



COMPARISON:12/04/17



FINDINGS: Stable cardiomegaly.  Redistribution of pulmonary blood flow 

to the upper lobes.The pulmonary vessels are more distinct than on the 

prior exam. The lungs are normally expanded and clear.  Median 

sternotomy wires and mediastinal surgical clips.



IMPRESSION: Interval improvement.  Mild CHF with cardiomegaly and 

venous hypertension.  No pulmonary edema.

## 2017-12-05 NOTE — CAT SCAN REPORT
FINAL REPORT



EXAM:  CT HEAD/BRAIN WO CON



HISTORY:  dizziness and headache 



TECHNIQUE:  Routine axial imaging was obtained of the brain

without IV contrast. Comparison is made to the study of

06/25/2016. 



FINDINGS:  

There is mild generalized atrophy. There is a remote stroke in

the left temporal lobe. There is no evidence of acute stroke or

hemorrhage. There is a remote lacunar infarct in the left

cerebellar hemisphere. The ventricular system is appropriate in

size and is symmetric. The visualized sinuses are clear. The

mastoid air cells are well pneumatized.



IMPRESSION:  

Mild generalized atrophy. No evidence of acute stroke or

hemorrhage.



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

hemisphere.

## 2018-02-16 ENCOUNTER — HOSPITAL ENCOUNTER (EMERGENCY)
Dept: HOSPITAL 5 - ED | Age: 63
LOS: 1 days | Discharge: HOME | End: 2018-02-17
Payer: MEDICARE
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
## 2018-02-16 DIAGNOSIS — S53.402A: Primary | ICD-10-CM

## 2018-02-16 DIAGNOSIS — I42.0: ICD-10-CM

## 2018-02-16 DIAGNOSIS — E86.9: ICD-10-CM

## 2018-02-16 DIAGNOSIS — I25.2: ICD-10-CM
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
## 2018-02-16 DIAGNOSIS — I50.9: ICD-10-CM
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
## 2018-02-16 DIAGNOSIS — I10: ICD-10-CM

## 2018-02-16 DIAGNOSIS — S93.602A: ICD-10-CM

## 2018-02-16 LAB
BASOPHILS # (AUTO): 0.1 K/MM3 (ref 0–0.1)
BASOPHILS NFR BLD AUTO: 0.5 % (ref 0–1.8)
EOSINOPHIL # BLD AUTO: 0.1 K/MM3 (ref 0–0.4)
EOSINOPHIL NFR BLD AUTO: 1 % (ref 0–4.3)
HCT VFR BLD CALC: 42 % (ref 30.3–42.9)
HGB BLD-MCNC: 13.6 GM/DL (ref 10.1–14.3)
LYMPHOCYTES # BLD AUTO: 1.2 K/MM3 (ref 1.2–5.4)
LYMPHOCYTES NFR BLD AUTO: 12.5 % (ref 13.4–35)
MCH RBC QN AUTO: 27 PG (ref 28–32)
MCHC RBC AUTO-ENTMCNC: 32 % (ref 30–34)
MCV RBC AUTO: 84 FL (ref 79–97)
MONOCYTES # (AUTO): 0.7 K/MM3 (ref 0–0.8)
MONOCYTES % (AUTO): 7.5 % (ref 0–7.3)
PLATELET # BLD: 267 K/MM3 (ref 140–440)
RBC # BLD AUTO: 4.99 M/MM3 (ref 3.65–5.03)

## 2018-02-16 PROCEDURE — 96360 HYDRATION IV INFUSION INIT: CPT

## 2018-02-16 PROCEDURE — 99284 EMERGENCY DEPT VISIT MOD MDM: CPT

## 2018-02-16 PROCEDURE — 73630 X-RAY EXAM OF FOOT: CPT

## 2018-02-16 PROCEDURE — 96361 HYDRATE IV INFUSION ADD-ON: CPT

## 2018-02-16 PROCEDURE — 36415 COLL VENOUS BLD VENIPUNCTURE: CPT

## 2018-02-16 PROCEDURE — 85025 COMPLETE CBC W/AUTO DIFF WBC: CPT

## 2018-02-16 PROCEDURE — 73070 X-RAY EXAM OF ELBOW: CPT

## 2018-02-16 PROCEDURE — 81001 URINALYSIS AUTO W/SCOPE: CPT

## 2018-02-16 PROCEDURE — 80053 COMPREHEN METABOLIC PANEL: CPT

## 2018-02-16 PROCEDURE — 71045 X-RAY EXAM CHEST 1 VIEW: CPT

## 2018-02-16 PROCEDURE — 70450 CT HEAD/BRAIN W/O DYE: CPT

## 2018-02-17 VITALS — DIASTOLIC BLOOD PRESSURE: 78 MMHG | SYSTOLIC BLOOD PRESSURE: 138 MMHG

## 2018-02-17 LAB
ALBUMIN SERPL-MCNC: 3.7 G/DL (ref 3.9–5)
ALT SERPL-CCNC: 28 UNITS/L (ref 7–56)
BILIRUB UR QL STRIP: (no result)
BLOOD UR QL VISUAL: (no result)
BUN SERPL-MCNC: 26 MG/DL (ref 7–17)
BUN/CREAT SERPL: 20 %
CALCIUM SERPL-MCNC: 9.3 MG/DL (ref 8.4–10.2)
HEMOLYSIS INDEX: 14
MUCOUS THREADS #/AREA URNS HPF: (no result) /HPF
NITRITE UR QL STRIP: (no result)
PH UR STRIP: 7 [PH] (ref 5–7)
PROT UR STRIP-MCNC: (no result) MG/DL
RBC #/AREA URNS HPF: 2 /HPF (ref 0–6)
UROBILINOGEN UR-MCNC: 4 MG/DL (ref ?–2)
WBC #/AREA URNS HPF: 5 /HPF (ref 0–6)

## 2018-02-17 NOTE — CAT SCAN REPORT
FINAL REPORT



PROCEDURE:  CT HEAD/BRAIN WO CON



TECHNIQUE:  Computerized tomography of the head was performed

without contrast material. 



HISTORY:  head injury 



COMPARISON:  No prior studies are available for comparison.



FINDINGS:  

Skull and scalp: Normal.



Paranasal sinuses: Normal.



Ventricles and subarachnoid spaces: Normal.



Cerebrum: Old stroke anterior left temporal lobe. Small old

lacunar infarction in the upper left cerebellar hemisphere. No

evidence of acute intracranial hemorrhage, hematoma or

infarction. No midline displacement or mass..



Cerebellum and brainstem: No evidence of hemorrhage, acute

infarction or mass.



Vasculature: Normal.



Comments: None.



IMPRESSION:  

There is no evidence of an acute intracranial process. Previous

infarction anterior left temporal lobe. Small old lacunar

infarction upper left cerebellar hemisphere.

## 2018-02-17 NOTE — XRAY REPORT
Performed views:



History: Fall and pain.



Findings:



There is osteopenia. No fracture or lytic lesion. Spur in the posterior 

inferior and superior aspect of the calcaneum. Arthritic changes in the 

talotibial joint and the talonavicular joint.



Impression:



There is osteopenia. Arthritic changes as detailed above.

## 2018-02-17 NOTE — XRAY REPORT
FINAL REPORT



EXAM:  XR CHEST 1V AP



HISTORY:  hypertension 



TECHNIQUE:  A portable semi-erect view of the chest was obtained

and compared to the study of 01/26/2018.



FINDINGS:  

The heart is moderately enlarged. There are postsurgical changes

from bypass surgery. The lungs are not overtly congested. There

haziness in left lung base secondary to residual

effusion/infiltrate. There is a pacemaker overlying the left

chest wall with the leads in the right atrium and right

ventricle. The bones and soft tissues otherwise are unchanged

from the previous study.



IMPRESSION:  

Cardiomegaly. Previous bypass surgery changes. Patchy residual

airspace disease and/or small left-sided effusion noted.

## 2018-02-17 NOTE — EMERGENCY DEPARTMENT REPORT
ED General Adult HPI





- General


Chief complaint: Head Injury


Stated complaint: MOLD EXPOSURE


Time Seen by Provider: 18 06:09


Source: patient


Mode of arrival: Ambulatory


Limitations: Physical Limitation





- History of Present Illness


Initial comments: 


This is a patient with a dilated cardiomyopathy who presents to the emergency 

department with unclear complaints.  She states that "my toes are swollen".  I 

have seen this patient before and she has a strange affect.  She tells me that 

she cannot return to her home, she has mold.  I see that she has already been 

registered for case management recently but they were unable to make contact 

with her.  Strangely the triage note states head injury.  She is not 

complaining of chest pain or cough.  She is not complaining of shortness of 

breath.  She has had several workups here for shortness of breath.  She is 

found to have an EF of 15-20%/dilated cardiomyopathy.  She has recently had a 

negative ventilation perfusion scan to rule out PE.  She is on Lasix lisinopril 

and metoprolol for hypertension as well as Atarax I presume for anxiety.





-: unknown (very poor historian and not providing duration of symptoms)


Quality: aching


Consistency: constant


Improves with: none


Worsens with: none


Associated Symptoms: denies other symptoms


Treatments Prior to Arrival: none





- Related Data


 Previous Rx's











 Medication  Instructions  Recorded  Last Taken  Type


 


Aspirin EC [Aspirin Enteric Coated 81 mg PO QDAY #30 tablet. 18 Unknown 

Rx





TAB]    


 


AtorvaSTATin [Lipitor] 40 mg PO QHS #30 tab 18 Unknown Rx


 


Furosemide [Lasix] 20 mg PO QDAY #30 tablet 18 Unknown Rx


 


Levofloxacin [Levaquin TAB] 750 mg PO Q24HR #7 tablet 18 Unknown Rx


 


Lisinopril [Zestril TAB] 5 mg PO QDAY #30 tablet 18 Unknown Rx


 


Metoclopramide [Reglan TAB] 10 mg PO QID PRN #20 tab 18 Unknown Rx


 


Metoprolol Xl [Metoprolol 25 mg PO QDAY #30 tablet 18 Unknown Rx





SUCCINATE ER TAB]    


 


Omeprazole 20 mg PO DAILY #30 capsule. 18 Unknown Rx


 


hydrOXYzine HCL [Atarax] 25 mg PO Q6HR PRN #10 tablet 01/29/18 Unknown Rx











 Allergies











Allergy/AdvReac Type Severity Reaction Status Date / Time


 


No Known Allergies Allergy   Verified 09/03/15 03:02














ED Review of Systems


ROS: 


Stated complaint: MOLD EXPOSURE


Other details as noted in HPI





Constitutional: denies: chills, fever


Eyes: denies: eye pain, eye discharge, vision change


ENT: denies: ear pain, throat pain


Respiratory: denies: cough, shortness of breath, wheezing


Cardiovascular: denies: chest pain, palpitations


Endocrine: no symptoms reported


Gastrointestinal: denies: abdominal pain, nausea, diarrhea


Genitourinary: denies: urgency, dysuria, discharge


Musculoskeletal: denies: back pain, joint swelling, arthralgia


Skin: denies: rash, lesions


Neurological: denies: headache, weakness, paresthesias


Psychiatric: denies: anxiety, depression


Hematological/Lymphatic: denies: easy bleeding, easy bruising





ED Past Medical Hx





- Past Medical History


Previous Medical History?: Yes


Hx Hypertension: Yes


Hx Heart Attack/AMI: Yes


Hx Congestive Heart Failure: Yes


Hx Diabetes: No


Hx Asthma: No


Hx COPD: No





- Surgical History


Hx Open Heart Surgery: Yes


Hx Pacemaker: Yes (L chest area)


Additional Surgical History: triple bypass, 





- Social History


Smoking Status: Never Smoker


Substance Use Type: None





- Medications


Home Medications: 


 Home Medications











 Medication  Instructions  Recorded  Confirmed  Last Taken  Type


 


Aspirin EC [Aspirin Enteric Coated 81 mg PO QDAY #30 tablet. 18  

Unknown Rx





TAB]     


 


AtorvaSTATin [Lipitor] 40 mg PO QHS #30 tab 18  Unknown Rx


 


Furosemide [Lasix] 20 mg PO QDAY #30 tablet 18  Unknown Rx


 


Levofloxacin [Levaquin TAB] 750 mg PO Q24HR #7 tablet 18  Unknown Rx


 


Lisinopril [Zestril TAB] 5 mg PO QDAY #30 tablet 18  Unknown Rx


 


Metoclopramide [Reglan TAB] 10 mg PO QID PRN #20 tab 18  Unknown Rx


 


Metoprolol Xl [Metoprolol 25 mg PO QDAY #30 tablet 18  Unknown Rx





SUCCINATE ER TAB]     


 


Omeprazole 20 mg PO DAILY #30 capsule. 18  Unknown Rx


 


hydrOXYzine HCL [Atarax] 25 mg PO Q6HR PRN #10 tablet 18  Unknown Rx














ED Physical Exam





- General


Limitations: Physical Limitation


General appearance: alert, in no apparent distress





- Head


Head exam: Present: atraumatic, normocephalic





- Eye


Eye exam: Present: normal appearance.  Absent: PERRL, EOMI, scleral icterus





- ENT


ENT exam: Present: mucous membranes moist





- Neck


Neck exam: Present: normal inspection.  Absent: tenderness, meningismus





- Respiratory


Respiratory exam: Present: normal lung sounds bilaterally.  Absent: respiratory 

distress





- Cardiovascular


Cardiovascular Exam: Present: regular rate, normal rhythm.  Absent: systolic 

murmur, diastolic murmur, rubs, gallop





- GI/Abdominal


GI/Abdominal exam: Present: soft, normal bowel sounds.  Absent: distended, 

tenderness, guarding, rebound, rigid





- Extremities Exam


Extremities exam: Present: normal inspection.  Absent: pedal edema, joint 

swelling, calf tenderness





- Back Exam


Back exam: Present: normal inspection.  Absent: CVA tenderness (R), CVA 

tenderness (L)





- Neurological Exam


Neurological exam: Present: alert, oriented X3, CN II-XII intact.  Absent: 

motor sensory deficit





- Psychiatric


Psychiatric exam: Present: normal mood, flat affect





- Skin


Skin exam: Present: warm, dry, intact, normal color.  Absent: rash





ED Course


 Vital Signs











  18





  23:10 05:12 06:30


 


Temperature 97.4 F L  97.5 F L


 


Pulse Rate 59 L  99 H


 


Respiratory 18  23





Rate   


 


Blood Pressure 111/79 124/93 


 


Blood Pressure   124/93





[Left]   


 


O2 Sat by Pulse 94 74 L 95





Oximetry   














  18





  06:32 08:44


 


Temperature  


 


Pulse Rate  103 H


 


Respiratory 23 20





Rate  


 


Blood Pressure  


 


Blood Pressure  136/99





[Left]  


 


O2 Sat by Pulse 95 95





Oximetry  














- Reevaluation(s)


Reevaluation #1: 


The patient is to be a bit prerenal.  Her pulse oximetry is 100% on room air.  

I will give her some controlled IV fluid and check her chest x-ray.  She does 

need case management.


18 07:13





Reevaluation #2: 


Patient observed and really questioned concerning the reason she presented.  

She now states that she fell down at an unknown store on [address] 

yesterday.  She complains of pain in her right elbow and left foot.  She states 

that she hit her head but "that's all right".  She did not have a loss of 

consciousness.  She states that she tripped.  She will be sent for x-rays of 

her foot and elbow there is 1-2+ pedal edema bilaterally.  There is tenderness 

on palpation of the left foot and right elbow but no deformity.


18 08:37





Reevaluation #3: 


The patient was seen by the .  She refused to give a home address.  

She gave some information about family members.  The  stated that 

she would try to get in touch with family members.





The patient tolerated IV fluids well pulse oximetry and blood pressure were 

well maintained work of breathing was normal.  X-rays of the elbow and the foot 

did not show fracture.  The patient will be discharged.  She states she does 

not have a follow-up cardiologist or primary care physician.  However she is 

just recently purchased all her medications.  She will be referred.


18 10:38








ED Medical Decision Making





- Lab Data


Result diagrams: 


 18 23:24





 18 23:24








 Laboratory Results - last 24 hr











  18





  23:24 23:24


 


WBC  10.0 


 


RBC  4.99 


 


Hgb  13.6 


 


Hct  42.0 


 


MCV  84 


 


MCH  27 L 


 


MCHC  32 


 


RDW  17.9 H 


 


Plt Count  267 


 


Lymph % (Auto)  12.5 L 


 


Mono % (Auto)  7.5 H 


 


Eos % (Auto)  1.0 


 


Baso % (Auto)  0.5 


 


Lymph #  1.2 


 


Mono #  0.7 


 


Eos #  0.1 


 


Baso #  0.1 


 


Seg Neutrophils %  78.5 H 


 


Seg Neutrophils #  7.9 H 


 


Sodium   135 L


 


Potassium   4.2


 


Chloride   94.7 L


 


Carbon Dioxide   23


 


Anion Gap   22


 


BUN   26 H


 


Creatinine   1.3 H


 


Estimated GFR   50


 


BUN/Creatinine Ratio   20


 


Glucose   116 H


 


Calcium   9.3


 


Total Bilirubin   0.70


 


AST   28


 


ALT   28


 


Alkaline Phosphatase   190 H


 


Total Protein   6.8


 


Albumin   3.7 L


 


Albumin/Globulin Ratio   1.2











Critical care attestation.: 


If time is entered above; I have spent that time in minutes in the direct care 

of this critically ill patient, excluding procedure time.








ED Disposition


Clinical Impression: 


 Dilated cardiomyopathy, Volume depletion





Sprain of elbow


Qualifiers:


 Encounter type: initial encounter Laterality: left Qualified Code(s): S53.402A 

- Unspecified sprain of left elbow, initial encounter





Sprain of foot, left


Qualifiers:


 Encounter type: initial encounter Qualified Code(s): S93.602A - Unspecified 

sprain of left foot, initial encounter





Disposition:  TO HOME OR SELFCARE


Is pt being admited?: No


Does the pt Need Aspirin: No


Condition: Stable


Instructions:  Heart Failure (ED), Elbow Sprain (ED), Foot Sprain (ED)


Additional Instructions: 


Return any acute change or problems.  Follow-up with referral physicians or 

clinic.


Referrals: 


PRIMARY MD HANNAH [Primary Care Provider] - 3-5 Days


MARCI MARTINEZ MD [Staff Physician] - 18


[facility] [Provider Group] - 3-5 Days


[facility] [Provider Group] - 3-5 Days


Time of Disposition: 10:41

## 2018-02-17 NOTE — XRAY REPORT
Right elbow 2 views:



History: Fall, pain.



Findings:



Arthritic changes are noted at the ulnar aspect of the elbow joint. No 

fracture dislocation or soft tissue calcification. No joint effusion.



Impression:



Arthritic changes at ulnar aspect of elbow joint.